# Patient Record
Sex: FEMALE | Race: ASIAN | NOT HISPANIC OR LATINO | Employment: FULL TIME | URBAN - METROPOLITAN AREA
[De-identification: names, ages, dates, MRNs, and addresses within clinical notes are randomized per-mention and may not be internally consistent; named-entity substitution may affect disease eponyms.]

---

## 2020-04-21 ENCOUNTER — HOSPITAL ENCOUNTER (OUTPATIENT)
Dept: LAB | Facility: MEDICAL CENTER | Age: 45
End: 2020-04-21
Attending: PHYSICIAN ASSISTANT
Payer: COMMERCIAL

## 2020-04-21 ENCOUNTER — HOSPITAL ENCOUNTER (OUTPATIENT)
Dept: RADIOLOGY | Facility: MEDICAL CENTER | Age: 45
End: 2020-04-21
Attending: PHYSICIAN ASSISTANT
Payer: COMMERCIAL

## 2020-04-21 ENCOUNTER — OFFICE VISIT (OUTPATIENT)
Dept: URGENT CARE | Facility: PHYSICIAN GROUP | Age: 45
End: 2020-04-21
Payer: COMMERCIAL

## 2020-04-21 VITALS
DIASTOLIC BLOOD PRESSURE: 76 MMHG | SYSTOLIC BLOOD PRESSURE: 110 MMHG | HEIGHT: 60 IN | HEART RATE: 84 BPM | OXYGEN SATURATION: 97 % | TEMPERATURE: 97.7 F | WEIGHT: 170 LBS | BODY MASS INDEX: 33.38 KG/M2

## 2020-04-21 DIAGNOSIS — K80.20 CALCULUS OF GALLBLADDER WITHOUT CHOLECYSTITIS WITHOUT OBSTRUCTION: ICD-10-CM

## 2020-04-21 DIAGNOSIS — R10.11 RIGHT UPPER QUADRANT PAIN: ICD-10-CM

## 2020-04-21 DIAGNOSIS — R10.13 EPIGASTRIC PAIN: ICD-10-CM

## 2020-04-21 DIAGNOSIS — R10.10 PAIN OF UPPER ABDOMEN: ICD-10-CM

## 2020-04-21 LAB
ALBUMIN SERPL BCP-MCNC: 4.4 G/DL (ref 3.2–4.9)
ALBUMIN/GLOB SERPL: 1.3 G/DL
ALP SERPL-CCNC: 92 U/L (ref 30–99)
ALT SERPL-CCNC: 664 U/L (ref 2–50)
ANION GAP SERPL CALC-SCNC: 16 MMOL/L (ref 7–16)
APPEARANCE UR: CLEAR
AST SERPL-CCNC: 758 U/L (ref 12–45)
BASOPHILS # BLD AUTO: 0.6 % (ref 0–1.8)
BASOPHILS # BLD: 0.07 K/UL (ref 0–0.12)
BILIRUB SERPL-MCNC: 1.9 MG/DL (ref 0.1–1.5)
BILIRUB UR STRIP-MCNC: ABNORMAL MG/DL
BUN SERPL-MCNC: 15 MG/DL (ref 8–22)
CALCIUM SERPL-MCNC: 10.5 MG/DL (ref 8.5–10.5)
CHLORIDE SERPL-SCNC: 94 MMOL/L (ref 96–112)
CO2 SERPL-SCNC: 28 MMOL/L (ref 20–33)
COLOR UR AUTO: ABNORMAL
CREAT SERPL-MCNC: 0.96 MG/DL (ref 0.5–1.4)
EOSINOPHIL # BLD AUTO: 0.03 K/UL (ref 0–0.51)
EOSINOPHIL NFR BLD: 0.2 % (ref 0–6.9)
ERYTHROCYTE [DISTWIDTH] IN BLOOD BY AUTOMATED COUNT: 38.8 FL (ref 35.9–50)
FASTING STATUS PATIENT QL REPORTED: NORMAL
GLOBULIN SER CALC-MCNC: 3.3 G/DL (ref 1.9–3.5)
GLUCOSE SERPL-MCNC: 178 MG/DL (ref 65–99)
GLUCOSE UR STRIP.AUTO-MCNC: NEGATIVE MG/DL
HCT VFR BLD AUTO: 44.2 % (ref 37–47)
HGB BLD-MCNC: 15.1 G/DL (ref 12–16)
IMM GRANULOCYTES # BLD AUTO: 0.04 K/UL (ref 0–0.11)
IMM GRANULOCYTES NFR BLD AUTO: 0.3 % (ref 0–0.9)
INT CON NEG: NORMAL
INT CON POS: NORMAL
KETONES UR STRIP.AUTO-MCNC: NEGATIVE MG/DL
LEUKOCYTE ESTERASE UR QL STRIP.AUTO: NEGATIVE
LIPASE SERPL-CCNC: 74 U/L (ref 11–82)
LYMPHOCYTES # BLD AUTO: 0.85 K/UL (ref 1–4.8)
LYMPHOCYTES NFR BLD: 6.8 % (ref 22–41)
MCH RBC QN AUTO: 30.4 PG (ref 27–33)
MCHC RBC AUTO-ENTMCNC: 34.2 G/DL (ref 33.6–35)
MCV RBC AUTO: 89.1 FL (ref 81.4–97.8)
MONOCYTES # BLD AUTO: 0.46 K/UL (ref 0–0.85)
MONOCYTES NFR BLD AUTO: 3.7 % (ref 0–13.4)
NEUTROPHILS # BLD AUTO: 11 K/UL (ref 2–7.15)
NEUTROPHILS NFR BLD: 88.4 % (ref 44–72)
NITRITE UR QL STRIP.AUTO: NEGATIVE
NRBC # BLD AUTO: 0 K/UL
NRBC BLD-RTO: 0 /100 WBC
PH UR STRIP.AUTO: 8.5 [PH] (ref 5–8)
PLATELET # BLD AUTO: 365 K/UL (ref 164–446)
PMV BLD AUTO: 10.1 FL (ref 9–12.9)
POC URINE PREGNANCY TEST: NEGATIVE
POTASSIUM SERPL-SCNC: 3.9 MMOL/L (ref 3.6–5.5)
PROT SERPL-MCNC: 7.7 G/DL (ref 6–8.2)
PROT UR QL STRIP: ABNORMAL MG/DL
RBC # BLD AUTO: 4.96 M/UL (ref 4.2–5.4)
RBC UR QL AUTO: NEGATIVE
SODIUM SERPL-SCNC: 138 MMOL/L (ref 135–145)
SP GR UR STRIP.AUTO: 1.02
UROBILINOGEN UR STRIP-MCNC: 0.2 MG/DL
WBC # BLD AUTO: 12.5 K/UL (ref 4.8–10.8)

## 2020-04-21 PROCEDURE — 80053 COMPREHEN METABOLIC PANEL: CPT

## 2020-04-21 PROCEDURE — 85025 COMPLETE CBC W/AUTO DIFF WBC: CPT

## 2020-04-21 PROCEDURE — 99204 OFFICE O/P NEW MOD 45 MIN: CPT | Performed by: PHYSICIAN ASSISTANT

## 2020-04-21 PROCEDURE — 36415 COLL VENOUS BLD VENIPUNCTURE: CPT

## 2020-04-21 PROCEDURE — 81002 URINALYSIS NONAUTO W/O SCOPE: CPT | Performed by: PHYSICIAN ASSISTANT

## 2020-04-21 PROCEDURE — 81025 URINE PREGNANCY TEST: CPT | Performed by: PHYSICIAN ASSISTANT

## 2020-04-21 PROCEDURE — 83690 ASSAY OF LIPASE: CPT

## 2020-04-21 PROCEDURE — 76705 ECHO EXAM OF ABDOMEN: CPT

## 2020-04-21 RX ORDER — ATORVASTATIN CALCIUM 10 MG/1
10 TABLET, FILM COATED ORAL DAILY
COMMUNITY
End: 2021-09-11

## 2020-04-21 RX ORDER — LEVOTHYROXINE SODIUM 88 UG/1
88 TABLET ORAL SEE ADMIN INSTRUCTIONS
COMMUNITY
Start: 2020-04-04 | End: 2021-09-11

## 2020-04-21 RX ORDER — NEBIVOLOL 10 MG/1
10 TABLET ORAL DAILY
COMMUNITY
Start: 2020-04-04

## 2020-04-21 RX ORDER — TRIAMTERENE AND HYDROCHLOROTHIAZIDE 37.5; 25 MG/1; MG/1
TABLET ORAL
COMMUNITY
Start: 2020-04-07 | End: 2021-06-06

## 2020-04-21 RX ORDER — OMEPRAZOLE 40 MG/1
40 CAPSULE, DELAYED RELEASE ORAL DAILY
Qty: 30 CAP | Refills: 0 | Status: SHIPPED | OUTPATIENT
Start: 2020-04-21 | End: 2021-06-06

## 2020-04-21 SDOH — HEALTH STABILITY: MENTAL HEALTH: HOW OFTEN DO YOU HAVE A DRINK CONTAINING ALCOHOL?: NEVER

## 2020-04-21 ASSESSMENT — ENCOUNTER SYMPTOMS
BACK PAIN: 0
CONSTIPATION: 0
FEVER: 0
BLOOD IN STOOL: 0
NAUSEA: 0
CHILLS: 0
DIZZINESS: 0
HEADACHES: 0
DIARRHEA: 0
HEARTBURN: 0
PALPITATIONS: 0
VOMITING: 0
ABDOMINAL PAIN: 1

## 2020-04-21 ASSESSMENT — PAIN SCALES - GENERAL: PAINLEVEL: 8=MODERATE-SEVERE PAIN

## 2020-04-21 NOTE — PATIENT INSTRUCTIONS
Cholelithiasis  Cholelithiasis is a form of gallbladder disease in which gallstones form in the gallbladder. The gallbladder is an organ that stores bile. Bile is made in the liver, and it helps to digest fats. Gallstones begin as small crystals and slowly grow into stones. They may cause no symptoms until the gallbladder tightens (contracts) and a gallstone is blocking the duct (gallbladder attack), which can cause pain. Cholelithiasis is also referred to as gallstones.  There are two main types of gallstones:  · Cholesterol stones. These are made of hardened cholesterol and are usually yellow-green in color. They are the most common type of gallstone. Cholesterol is a white, waxy, fat-like substance that is made in the liver.  · Pigment stones. These are dark in color and are made of a red-yellow substance that forms when hemoglobin from red blood cells breaks down (bilirubin).  What are the causes?  This condition may be caused by an imbalance in the substances that bile is made of. This can happen if the bile:  · Has too much bilirubin.  · Has too much cholesterol.  · Does not have enough bile salts. These salts help the body absorb and digest fats.  In some cases, this condition can also be caused by the gallbladder not emptying completely or often enough.  What increases the risk?  The following factors may make you more likely to develop this condition:  · Being female.  · Having multiple pregnancies. Health care providers sometimes advise removing diseased gallbladders before future pregnancies.  · Eating a diet that is heavy in fried foods, fat, and refined carbohydrates, like white bread and white rice.  · Being obese.  · Being older than age 40.  · Prolonged use of medicines that contain female hormones (estrogen).  · Having diabetes mellitus.  · Rapidly losing weight.  · Having a family history of gallstones.  · Being of  or [place] descent.  · Having an intestinal disease such as Crohn  disease.  · Having metabolic syndrome.  · Having cirrhosis.  · Having severe types of anemia such as sickle cell anemia.  What are the signs or symptoms?  In most cases, there are no symptoms. These are known as silent gallstones. If a gallstone blocks the bile ducts, it can cause a gallbladder attack. The main symptom of a gallbladder attack is sudden pain in the upper right abdomen. The pain usually comes at night or after eating a large meal. The pain can last for one or several hours and can spread to the right shoulder or chest.  If the bile duct is blocked for more than a few hours, it can cause infection or inflammation of the gallbladder, liver, or pancreas, which may cause:  · Nausea.  · Vomiting.  · Abdominal pain that lasts for 5 hours or more.  · Fever or chills.  · Yellowing of the skin or the whites of the eyes (jaundice).  · Dark urine.  · Light-colored stools.  How is this diagnosed?  This condition may be diagnosed based on:  · A physical exam.  · Your medical history.  · An ultrasound of your gallbladder.  · CT scan.  · MRI.  · Blood tests to check for signs of infection or inflammation.  · A scan of your gallbladder and bile ducts (biliary system) using nonharmful radioactive material and special cameras that can see the radioactive material (cholescintigram). This test checks to see how your gallbladder contracts and whether bile ducts are blocked.  · Inserting a small tube with a camera on the end (endoscope) through your mouth to inspect bile ducts and check for blockages (endoscopic retrograde cholangiopancreatogram).  How is this treated?  Treatment for gallstones depends on the severity of the condition. Silent gallstones do not need treatment. If the gallstones cause a gallbladder attack or other symptoms, treatment may be required. Options for treatment include:  · Surgery to remove the gallbladder (cholecystectomy). This is the most common treatment.  · Medicines to dissolve gallstones.  These are most effective at treating small gallstones. You may need to take medicines for up to 6-12 months.  · Shock wave treatment (extracorporeal biliary lithotripsy). In this treatment, an ultrasound machine sends shock waves to the gallbladder to break gallstones into smaller pieces. These pieces can then be passed into the intestines or be dissolved by medicine. This is rarely used.  · Removing gallstones through endoscopic retrograde cholangiopancreatogram. A small basket can be attached to the endoscope and used to capture and remove gallstones.  Follow these instructions at home:  · Take over-the-counter and prescription medicines only as told by your health care provider.  · Maintain a healthy weight and follow a healthy diet. This includes:  ¨ Reducing fatty foods, such as fried food.  ¨ Reducing refined carbohydrates, like white bread and white rice.  ¨ Increasing fiber. Aim for foods like almonds, fruit, and beans.  · Keep all follow-up visits as told by your health care provider. This is important.  Contact a health care provider if:  · You think you have had a gallbladder attack.  · You have been diagnosed with silent gallstones and you develop abdominal pain or indigestion.  Get help right away if:  · You have pain from a gallbladder attack that lasts for more than 2 hours.  · You have abdominal pain that lasts for more than 5 hours.  · You have a fever or chills.  · You have persistent nausea and vomiting.  · You develop jaundice.  · You have dark urine or light-colored stools.  Summary  · Cholelithiasis (also called gallstones) is a form of gallbladder disease in which gallstones form in the gallbladder.  · This condition is caused by an imbalance in the substances that make up bile. This can happen if the bile has too much cholesterol, too much bilirubin, or not enough bile salts.  · You are more likely to develop this condition if you are female, pregnant, using medicines with estrogen, obese,  older than age 40, or have a family history of gallstones. You may also develop gallstones if you have diabetes, an intestinal disease, cirrhosis, or metabolic syndrome.  · Treatment for gallstones depends on the severity of the condition. Silent gallstones do not need treatment.  · If gallstones cause a gallbladder attack or other symptoms, treatment may be needed. The most common treatment is surgery to remove the gallbladder.  This information is not intended to replace advice given to you by your health care provider. Make sure you discuss any questions you have with your health care provider.  Document Released: 12/14/2006 Document Revised: 09/03/2017 Document Reviewed: 09/03/2017  [employer] Patient Education © 2017 [employer]

## 2020-04-21 NOTE — PROGRESS NOTES
Subjective:   Bekah Ahumada is a 44 y.o. female who presents for Abdominal Pain (cramping and persistant pain in upper abdomenal pain, after dinner, bloating x1day)        This is a new problem.  Patient complains of epigastric and right upper quadrant postprandial pain x 2 days.  Symptoms began last night after dinner.  Pain was fairly severe at 8 out of 10.  Did not radiate.  Pain was relieved by placing her arms overhead.  Additionally she induced vomiting and this also improved symptoms.  She took Tums and Gas-X-these did not help symptoms.  She denies nausea, fevers, chills, diarrhea.  At 4 AM symptoms completely resolved and she was asymptomatic until she ate brunch at 11 AM today.  Patient developed identical symptoms that have not improved since initial onset.  Patient denies history of similar symptoms.  Her mother did have her gallbladder removed.  Patient has history of laparoscopic hysterectomy, but otherwise no abdominal surgery.  No other aggravating or alleviating factors.    Review of Systems   Constitutional: Negative for chills, fever and malaise/fatigue.   Cardiovascular: Negative for chest pain and palpitations.   Gastrointestinal: Positive for abdominal pain. Negative for blood in stool, constipation, diarrhea, heartburn, melena, nausea and vomiting.   Musculoskeletal: Negative for back pain.   Neurological: Negative for dizziness and headaches.       PMH:  has a past medical history of Diabetes ([facility]).  MEDS:   Current Outpatient Medications:   •  atorvastatin (LIPITOR) 10 MG Tab, , Disp: , Rfl:   •  levothyroxine (SYNTHROID) 88 MCG Tab, Take 88 mcg by mouth every day., Disp: , Rfl:   •  nebivolol (BYSTOLIC) 10 MG Tab, Take 10 mg by mouth every day., Disp: , Rfl:   •  triamterene-hctz (MAXZIDE-25/DYAZIDE) 37.5-25 MG Tab, , Disp: , Rfl:   •  SITagliptin (JANUVIA) 50 MG Tab, Take 50 mg by mouth every day., Disp: , Rfl:   •  omeprazole (PRILOSEC) 40 MG delayed-release capsule, Take 1  Cap by mouth every day., Disp: 30 Cap, Rfl: 0  ALLERGIES:   Allergies   Allergen Reactions   • Acetazolamide      Tachycardia, dyspnea  Tachycardia, dyspnea  (taking meds for high altitude)       SURGHX: History reviewed. No pertinent surgical history.  SOCHX:  reports that she has never smoked. She has never used smokeless tobacco. She reports that she does not drink alcohol or use drugs.  FH: Family history was reviewed, no pertinent findings to report   Objective:   /76   Pulse 84   Temp 36.5 °C (97.7 °F)   Ht 1.524 m (5')   Wt 77.1 kg (170 lb)   SpO2 97%   BMI 33.20 kg/m²   Physical Exam  Vitals signs reviewed.   Constitutional:       General: She is not in acute distress.     Appearance: Normal appearance. She is well-developed. She is not ill-appearing or toxic-appearing.   HENT:      Head: Normocephalic and atraumatic.      Right Ear: External ear normal.      Left Ear: External ear normal.      Nose: Nose normal.   Eyes:      General: Lids are normal.      Conjunctiva/sclera: Conjunctivae normal.   Neck:      Musculoskeletal: Neck supple.   Cardiovascular:      Rate and Rhythm: Normal rate and regular rhythm.   Pulmonary:      Effort: Pulmonary effort is normal. No respiratory distress.      Breath sounds: Normal breath sounds.   Abdominal:      General: Abdomen is flat. Bowel sounds are normal.      Palpations: Abdomen is soft.      Tenderness: There is abdominal tenderness in the right upper quadrant and epigastric area. There is no right CVA tenderness, left CVA tenderness, guarding or rebound. Positive signs include Titus's sign. Negative signs include McBurney's sign.   Skin:     General: Skin is warm and dry.      Capillary Refill: Capillary refill takes less than 2 seconds.   Neurological:      Mental Status: She is alert and oriented to person, place, and time.      Cranial Nerves: No cranial nerve deficit.      Sensory: No sensory deficit.   Psychiatric:         Speech: Speech normal.          Behavior: Behavior normal.         Thought Content: Thought content normal.         Judgment: Judgment normal.           Assessment/Plan:   1. Calculus of gallbladder without cholecystitis without obstruction  - REFERRAL TO GENERAL SURGERY    2. Pain of upper abdomen  - POCT Urinalysis  - POCT Pregnancy  - omeprazole (PRILOSEC) 40 MG delayed-release capsule; Take 1 Cap by mouth every day.  Dispense: 30 Cap; Refill: 0    3. Epigastric pain  - US-RUQ; Future  - CBC WITH DIFFERENTIAL; Future  - LIPASE; Future  - Comp Metabolic Panel; Future  - omeprazole (PRILOSEC) 40 MG delayed-release capsule; Take 1 Cap by mouth every day.  Dispense: 30 Cap; Refill: 0    4. Right upper quadrant pain  - US-RUQ; Future  - CBC WITH DIFFERENTIAL; Future  - LIPASE; Future  - Comp Metabolic Panel; Future    Other orders  - atorvastatin (LIPITOR) 10 MG Tab  - levothyroxine (SYNTHROID) 88 MCG Tab; Take 88 mcg by mouth every day.  - nebivolol (BYSTOLIC) 10 MG Tab; Take 10 mg by mouth every day.  - triamterene-hctz (MAXZIDE-25/DYAZIDE) 37.5-25 MG Tab  - SITagliptin (JANUVIA) 50 MG Tab; Take 50 mg by mouth every day.    History and physical exam suggestive of cholelithiasis.  Other considerations include but not limited to cholecystitis, choledocholithiasis, hepatitis, pancreatitis, gastritis, GERD.  Ultrasound and labs to further evaluate:    US:  FINDINGS:  The liver is normal in contour. The liver is homogeneous and has increased echogenicity consistent with fatty infiltration. There is no evidence of solid mass lesion. The liver measures 18.61 cm.     The gallbladder contains multiple moderate-sized gallstones.  The gallbladder wall thickness measures 1.30 mm. There is no pericholecystic fluid.  The common duct measures 7.10 mm. No choledocholithiasis is identified.     The visualized pancreas is unremarkable.  The visualized aorta is normal in caliber.     Intrahepatic IVC is patent.     The portal vein is patent with hepatopetal  flow. The MPV measures 1.02 cm.     The right kidney measures 11.16 cm.     There is no ascites.     IMPRESSION:     1.  Cholelithiasis. No intrahepatic biliary dilatation. Mild extrahepatic biliary dilatation. No choledocholithiasis identified.     2.  Fatty infiltration appearance of the liver.     3.  Otherwise negative right upper quadrant ultrasound.    Ultrasound reviewed with patient.  Labs are pending at this time.  Patient's pain spontaneously resolved while she was in clinic and reports that pain level currently 0 out of 10. Patient well-appearing, and vital signs stable.  No nausea, vomiting, fevers, chills diarrhea.  Urgent referral placed to follow-up with general surgery.  Patient is from out of the area and does not have PCP to follow-up with in [place].  Patient given very strict ED precautions.  Both patient and  verbalized good understanding of these.    ----    Patient labs resulted and reviewed at 1830.  Patient's bili slightly elevated at 1.9 and liver enzymes significantly elevated at  and .  Alk phos fate upper end of normal at 92. Mild leukocytosis at 12.5 with left shift.  Lipase within normal limits at 74.    Lab results concerning for possible biliary obstruction.  Case discussed with Urgent Care chief,  Dr. Calderón.  Dr. Calderón recommends stat GEN surge referral with strict ED precautions, as long as patient remains completely asymptomatic.  If patient has ANY recurrence of pain, development of nausea, vomiting, fevers, chills or any other symptoms she was instructed to go to the ED for immediate medical reevaluation.  All lab abnormalities and concerns about possible biliary obstruction discussed with patient over the phone in 1900.  Patient verbalized good understanding of my concerns and that she needs to go to the emergency room with any recurrence of symptoms/pain. All questions answered.     Differential diagnosis, natural history, supportive care, and indications for  immediate follow-up discussed.

## 2021-06-06 ENCOUNTER — HOSPITAL ENCOUNTER (EMERGENCY)
Facility: MEDICAL CENTER | Age: 46
End: 2021-06-06
Attending: EMERGENCY MEDICINE
Payer: COMMERCIAL

## 2021-06-06 ENCOUNTER — APPOINTMENT (OUTPATIENT)
Dept: RADIOLOGY | Facility: MEDICAL CENTER | Age: 46
End: 2021-06-06
Attending: EMERGENCY MEDICINE
Payer: COMMERCIAL

## 2021-06-06 VITALS
OXYGEN SATURATION: 98 % | DIASTOLIC BLOOD PRESSURE: 59 MMHG | TEMPERATURE: 101.6 F | SYSTOLIC BLOOD PRESSURE: 116 MMHG | WEIGHT: 172.84 LBS | BODY MASS INDEX: 33.93 KG/M2 | RESPIRATION RATE: 20 BRPM | HEART RATE: 86 BPM | HEIGHT: 60 IN

## 2021-06-06 DIAGNOSIS — R50.9 FEVER, UNSPECIFIED FEVER CAUSE: ICD-10-CM

## 2021-06-06 DIAGNOSIS — R21 RASH: ICD-10-CM

## 2021-06-06 LAB
ALBUMIN SERPL BCP-MCNC: 3.4 G/DL (ref 3.2–4.9)
ALBUMIN SERPL BCP-MCNC: 3.6 G/DL (ref 3.2–4.9)
ALBUMIN/GLOB SERPL: 1.1 G/DL
ALBUMIN/GLOB SERPL: 1.3 G/DL
ALP SERPL-CCNC: 60 U/L (ref 30–99)
ALP SERPL-CCNC: 63 U/L (ref 30–99)
ALT SERPL-CCNC: 120 U/L (ref 2–50)
ALT SERPL-CCNC: 144 U/L (ref 2–50)
ANION GAP SERPL CALC-SCNC: 11 MMOL/L (ref 7–16)
ANION GAP SERPL CALC-SCNC: 9 MMOL/L (ref 7–16)
ANISOCYTOSIS BLD QL SMEAR: ABNORMAL
APAP SERPL-MCNC: <5 UG/ML (ref 10–30)
APPEARANCE UR: CLEAR
AST SERPL-CCNC: 114 U/L (ref 12–45)
AST SERPL-CCNC: 162 U/L (ref 12–45)
BASOPHILS # BLD AUTO: 0 % (ref 0–1.8)
BASOPHILS # BLD: 0 K/UL (ref 0–0.12)
BILIRUB SERPL-MCNC: 0.5 MG/DL (ref 0.1–1.5)
BILIRUB SERPL-MCNC: 0.6 MG/DL (ref 0.1–1.5)
BILIRUB UR QL STRIP.AUTO: NEGATIVE
BUN SERPL-MCNC: 8 MG/DL (ref 8–22)
BUN SERPL-MCNC: 8 MG/DL (ref 8–22)
CALCIUM SERPL-MCNC: 8.3 MG/DL (ref 8.5–10.5)
CALCIUM SERPL-MCNC: 8.8 MG/DL (ref 8.5–10.5)
CHLORIDE SERPL-SCNC: 102 MMOL/L (ref 96–112)
CHLORIDE SERPL-SCNC: 99 MMOL/L (ref 96–112)
CO2 SERPL-SCNC: 21 MMOL/L (ref 20–33)
CO2 SERPL-SCNC: 23 MMOL/L (ref 20–33)
COLOR UR: YELLOW
CREAT SERPL-MCNC: 1.11 MG/DL (ref 0.5–1.4)
CREAT SERPL-MCNC: 1.21 MG/DL (ref 0.5–1.4)
EOSINOPHIL # BLD AUTO: 0 K/UL (ref 0–0.51)
EOSINOPHIL NFR BLD: 0 % (ref 0–6.9)
ERYTHROCYTE [DISTWIDTH] IN BLOOD BY AUTOMATED COUNT: 43.9 FL (ref 35.9–50)
FLUAV RNA SPEC QL NAA+PROBE: NEGATIVE
FLUBV RNA SPEC QL NAA+PROBE: NEGATIVE
GLOBULIN SER CALC-MCNC: 2.7 G/DL (ref 1.9–3.5)
GLOBULIN SER CALC-MCNC: 3.3 G/DL (ref 1.9–3.5)
GLUCOSE SERPL-MCNC: 108 MG/DL (ref 65–99)
GLUCOSE SERPL-MCNC: 130 MG/DL (ref 65–99)
GLUCOSE UR STRIP.AUTO-MCNC: NEGATIVE MG/DL
HAV IGM SERPL QL IA: NORMAL
HBV CORE IGM SER QL: NORMAL
HBV SURFACE AG SER QL: NORMAL
HCT VFR BLD AUTO: 34.3 % (ref 37–47)
HCV AB SER QL: NORMAL
HETEROPH AB SER QL: NEGATIVE
HGB BLD-MCNC: 11.7 G/DL (ref 12–16)
INR PPP: 1.05 (ref 0.87–1.13)
KETONES UR STRIP.AUTO-MCNC: NEGATIVE MG/DL
LACTATE BLD-SCNC: 2 MMOL/L (ref 0.5–2)
LACTATE BLD-SCNC: 2.5 MMOL/L (ref 0.5–2)
LEUKOCYTE ESTERASE UR QL STRIP.AUTO: NEGATIVE
LYMPHOCYTES # BLD AUTO: 1.36 K/UL (ref 1–4.8)
LYMPHOCYTES NFR BLD: 14.2 % (ref 22–41)
MANUAL DIFF BLD: NORMAL
MCH RBC QN AUTO: 31.8 PG (ref 27–33)
MCHC RBC AUTO-ENTMCNC: 34.1 G/DL (ref 33.6–35)
MCV RBC AUTO: 93.2 FL (ref 81.4–97.8)
MICRO URNS: NORMAL
MICROCYTES BLD QL SMEAR: ABNORMAL
MONOCYTES # BLD AUTO: 0 K/UL (ref 0–0.85)
MONOCYTES NFR BLD AUTO: 0 % (ref 0–13.4)
MORPHOLOGY BLD-IMP: NORMAL
NEUTROPHILS # BLD AUTO: 8.24 K/UL (ref 2–7.15)
NEUTROPHILS NFR BLD: 84.9 % (ref 44–72)
NEUTS BAND NFR BLD MANUAL: 0.9 % (ref 0–10)
NITRITE UR QL STRIP.AUTO: NEGATIVE
NRBC # BLD AUTO: 0.04 K/UL
NRBC BLD-RTO: 0.4 /100 WBC
PH UR STRIP.AUTO: 6 [PH] (ref 5–8)
PLATELET # BLD AUTO: 323 K/UL (ref 164–446)
PLATELET BLD QL SMEAR: NORMAL
PMV BLD AUTO: 8.8 FL (ref 9–12.9)
POLYCHROMASIA BLD QL SMEAR: NORMAL
POTASSIUM SERPL-SCNC: 3.4 MMOL/L (ref 3.6–5.5)
POTASSIUM SERPL-SCNC: 3.5 MMOL/L (ref 3.6–5.5)
PROT SERPL-MCNC: 6.1 G/DL (ref 6–8.2)
PROT SERPL-MCNC: 6.9 G/DL (ref 6–8.2)
PROT UR QL STRIP: NEGATIVE MG/DL
PROTHROMBIN TIME: 14 SEC (ref 12–14.6)
RBC # BLD AUTO: 3.68 M/UL (ref 4.2–5.4)
RBC BLD AUTO: PRESENT
RBC UR QL AUTO: NEGATIVE
RSV RNA SPEC QL NAA+PROBE: NEGATIVE
SARS-COV-2 RNA RESP QL NAA+PROBE: NOTDETECTED
SODIUM SERPL-SCNC: 131 MMOL/L (ref 135–145)
SODIUM SERPL-SCNC: 134 MMOL/L (ref 135–145)
SP GR UR STRIP.AUTO: 1
SPECIMEN SOURCE: NORMAL
T4 FREE SERPL-MCNC: 1.08 NG/DL (ref 0.93–1.7)
TREPONEMA PALLIDUM IGG+IGM AB [PRESENCE] IN SERUM OR PLASMA BY IMMUNOASSAY: NORMAL
TSH SERPL DL<=0.005 MIU/L-ACNC: 2.53 UIU/ML (ref 0.38–5.33)
UROBILINOGEN UR STRIP.AUTO-MCNC: 0.2 MG/DL
WBC # BLD AUTO: 9.6 K/UL (ref 4.8–10.8)

## 2021-06-06 PROCEDURE — C9803 HOPD COVID-19 SPEC COLLECT: HCPCS | Performed by: EMERGENCY MEDICINE

## 2021-06-06 PROCEDURE — 84439 ASSAY OF FREE THYROXINE: CPT

## 2021-06-06 PROCEDURE — 86618 LYME DISEASE ANTIBODY: CPT

## 2021-06-06 PROCEDURE — 85610 PROTHROMBIN TIME: CPT

## 2021-06-06 PROCEDURE — 84443 ASSAY THYROID STIM HORMONE: CPT

## 2021-06-06 PROCEDURE — 700102 HCHG RX REV CODE 250 W/ 637 OVERRIDE(OP): Performed by: STUDENT IN AN ORGANIZED HEALTH CARE EDUCATION/TRAINING PROGRAM

## 2021-06-06 PROCEDURE — 80053 COMPREHEN METABOLIC PANEL: CPT

## 2021-06-06 PROCEDURE — 80143 DRUG ASSAY ACETAMINOPHEN: CPT

## 2021-06-06 PROCEDURE — 86308 HETEROPHILE ANTIBODY SCREEN: CPT

## 2021-06-06 PROCEDURE — A9270 NON-COVERED ITEM OR SERVICE: HCPCS | Performed by: EMERGENCY MEDICINE

## 2021-06-06 PROCEDURE — 71045 X-RAY EXAM CHEST 1 VIEW: CPT

## 2021-06-06 PROCEDURE — 99284 EMERGENCY DEPT VISIT MOD MDM: CPT

## 2021-06-06 PROCEDURE — 87040 BLOOD CULTURE FOR BACTERIA: CPT

## 2021-06-06 PROCEDURE — 86757 RICKETTSIA ANTIBODY: CPT

## 2021-06-06 PROCEDURE — 700105 HCHG RX REV CODE 258: Performed by: EMERGENCY MEDICINE

## 2021-06-06 PROCEDURE — 83605 ASSAY OF LACTIC ACID: CPT

## 2021-06-06 PROCEDURE — 87086 URINE CULTURE/COLONY COUNT: CPT

## 2021-06-06 PROCEDURE — 85007 BL SMEAR W/DIFF WBC COUNT: CPT

## 2021-06-06 PROCEDURE — 700102 HCHG RX REV CODE 250 W/ 637 OVERRIDE(OP): Performed by: EMERGENCY MEDICINE

## 2021-06-06 PROCEDURE — 36415 COLL VENOUS BLD VENIPUNCTURE: CPT

## 2021-06-06 PROCEDURE — A9270 NON-COVERED ITEM OR SERVICE: HCPCS | Performed by: STUDENT IN AN ORGANIZED HEALTH CARE EDUCATION/TRAINING PROGRAM

## 2021-06-06 PROCEDURE — 85027 COMPLETE CBC AUTOMATED: CPT

## 2021-06-06 PROCEDURE — 99284 EMERGENCY DEPT VISIT MOD MDM: CPT | Mod: GC | Performed by: HOSPITALIST

## 2021-06-06 PROCEDURE — 0241U HCHG SARS-COV-2 COVID-19 NFCT DS RESP RNA 4 TRGT MIC: CPT

## 2021-06-06 PROCEDURE — 81003 URINALYSIS AUTO W/O SCOPE: CPT

## 2021-06-06 PROCEDURE — 80074 ACUTE HEPATITIS PANEL: CPT

## 2021-06-06 PROCEDURE — 86780 TREPONEMA PALLIDUM: CPT

## 2021-06-06 PROCEDURE — 87476 LYME DIS DNA AMP PROBE: CPT

## 2021-06-06 RX ORDER — POTASSIUM CHLORIDE 20 MEQ/1
40 TABLET, EXTENDED RELEASE ORAL ONCE
Status: COMPLETED | OUTPATIENT
Start: 2021-06-06 | End: 2021-06-06

## 2021-06-06 RX ORDER — DOXYCYCLINE 100 MG/1
100 CAPSULE ORAL 2 TIMES DAILY
Qty: 20 CAPSULE | Refills: 0 | Status: ON HOLD | OUTPATIENT
Start: 2021-06-06 | End: 2021-06-19

## 2021-06-06 RX ORDER — CETIRIZINE HYDROCHLORIDE 10 MG/1
10 CAPSULE, LIQUID FILLED ORAL DAILY
COMMUNITY
End: 2021-06-06

## 2021-06-06 RX ORDER — CETIRIZINE HYDROCHLORIDE 10 MG/1
10 TABLET ORAL DAILY
Status: ON HOLD | COMMUNITY
End: 2021-09-12

## 2021-06-06 RX ORDER — OLMESARTAN MEDOXOMIL 20 MG/1
20 TABLET ORAL DAILY
COMMUNITY
End: 2021-06-06

## 2021-06-06 RX ORDER — IBUPROFEN 600 MG/1
600 TABLET ORAL ONCE
Status: COMPLETED | OUTPATIENT
Start: 2021-06-06 | End: 2021-06-06

## 2021-06-06 RX ORDER — ALLOPURINOL 300 MG/1
300 TABLET ORAL DAILY
COMMUNITY
End: 2021-06-06

## 2021-06-06 RX ORDER — M-VIT,TX,IRON,MINS/CALC/FOLIC 27MG-0.4MG
1 TABLET ORAL DAILY
COMMUNITY
End: 2021-06-06

## 2021-06-06 RX ORDER — SODIUM CHLORIDE, SODIUM LACTATE, POTASSIUM CHLORIDE, AND CALCIUM CHLORIDE .6; .31; .03; .02 G/100ML; G/100ML; G/100ML; G/100ML
30 INJECTION, SOLUTION INTRAVENOUS ONCE
Status: COMPLETED | OUTPATIENT
Start: 2021-06-06 | End: 2021-06-06

## 2021-06-06 RX ORDER — B-COMPLEX WITH VITAMIN C
1 TABLET ORAL DAILY
COMMUNITY

## 2021-06-06 RX ADMIN — IBUPROFEN 600 MG: 600 TABLET, FILM COATED ORAL at 13:10

## 2021-06-06 RX ADMIN — SODIUM CHLORIDE, POTASSIUM CHLORIDE, SODIUM LACTATE AND CALCIUM CHLORIDE 2000 ML: 600; 310; 30; 20 INJECTION, SOLUTION INTRAVENOUS at 14:16

## 2021-06-06 RX ADMIN — POTASSIUM CHLORIDE 40 MEQ: 1500 TABLET, EXTENDED RELEASE ORAL at 18:12

## 2021-06-06 ASSESSMENT — ENCOUNTER SYMPTOMS
MYALGIAS: 0
ABDOMINAL PAIN: 1
FEVER: 1
COUGH: 0
BLOOD IN STOOL: 0
PHOTOPHOBIA: 0
SHORTNESS OF BREATH: 0
NAUSEA: 1
DIZZINESS: 0
DIARRHEA: 1
CHILLS: 1
BLURRED VISION: 0
VOMITING: 0
CONSTIPATION: 0
WEIGHT LOSS: 0
TINGLING: 0

## 2021-06-06 ASSESSMENT — LIFESTYLE VARIABLES: DO YOU DRINK ALCOHOL: NO

## 2021-06-06 ASSESSMENT — FIBROSIS 4 INDEX: FIB4 SCORE: 3.79

## 2021-06-06 NOTE — ED TRIAGE NOTES
Ambulates to triage  Chief Complaint   Patient presents with   • Fever     x3 days 102 this morning before taking dayquil   • Rash     to arms and torso, noticed this morning   • Ear Pain     pain behild both ears x3 days     Current temp is 98.9, pt noticed a rash this morning, does not itch, denies any cough, started to get a headache today, denies body aches.  [facility]

## 2021-06-06 NOTE — ED NOTES
Med rec updated and complete. Allergies reviewed.  Met with pt at bedside. Pt denies antibiotic use in last 14 days. Pt only takes levothyroxine Monday -Friday         [phone]

## 2021-06-06 NOTE — ED PROVIDER NOTES
ED Provider Note    CHIEF COMPLAINT  Chief Complaint   Patient presents with   • Fever     x3 days 102 this morning before taking dayquil   • Rash     to arms and torso, noticed this morning   • Ear Pain     pain behild both ears x3 days       HPI  Bekah Morrell is a 45 y.o. diabetic female with history of hypertension and dyslipidemia as well as thyroid disorder who presents complaining of fever and rash.    Patient states she has had a fever for 3 days.  T-max of 103.  Today, she noticed a generalized rash that is nonpruritic.  She also reports nausea and generalized, crampy abdominal pain with loose stool x4 today.  She states the area behind her ears is sensitive to touch.    Patient denies recent travel outside the country, recent antibiotics, urinary symptoms, vomiting, sick contacts, tick bites.      ALLERGIES  Allergies   Allergen Reactions   • Acetazolamide      Tachycardia, dyspnea  Tachycardia, dyspnea  (taking meds for high altitude)         CURRENT MEDICATIONS  Home Medications     Reviewed by Krissy Mason R.N. (Registered Nurse) on 06/06/21 at 1028  Med List Status: Complete   Medication Last Dose Status   allopurinol (ZYLOPRIM) 300 MG Tab 6/6/2021 Active   atorvastatin (LIPITOR) 10 MG Tab 6/5/2021 Active   Cetirizine HCl (ZYRTEC ALLERGY) 10 MG Cap 6/6/2021 Active   levothyroxine (SYNTHROID) 88 MCG Tab 6/6/2021 Active   nebivolol (BYSTOLIC) 10 MG Tab 6/6/2021 Active   olmesartan (BENICAR) 20 MG Tab 6/6/2021 Active   SITagliptin (JANUVIA) 50 MG Tab 6/6/2021 Active                PAST MEDICAL HISTORY   has a past medical history of Diabetes (HCC), High cholesterol, Hypertension, and Hypothyroid.    SURGICAL HISTORY   has a past surgical history that includes cholecystectomy (07/2020); hysterectomy radical (2018); and primary c section (1991).    SOCIAL HISTORY  Social History     Tobacco Use   • Smoking status: Never Smoker   • Smokeless tobacco: Never Used   Substance and Sexual  Activity   • Alcohol use: Never   • Drug use: Never   • Sexual activity: Not on file       Family Hx:  Denies      REVIEW OF SYSTEMS  See HPI for further details.  All other systems are negative except as above in HPI.    PHYSICAL EXAM  VITAL SIGNS: /64   Pulse 99   Temp (!) 39.7 °C (103.5 °F) (Oral)   Resp 20   Ht 1.524 m (5')   Wt 78.4 kg (172 lb 13.5 oz)   SpO2 96%   BMI 33.76 kg/m²    General:  WDWN female, nontoxic appearing in NAD; A+Ox3; V/S as above; afebrile  Skin: warm and dry; good color; diffuse morbilliform rash over the trunk and extremities; no petechiae or purpura  HEENT: NCAT; EOMs intact; PERRL; no scleral icterus; no mastoid tenderness, oropharynx clear  Neck: FROM; no meningismus, no LAD; no occipital nodes  Cardiovascular: Regular heart rate and rhythm.  No murmurs, rubs, or gallops; pulses 2+ bilaterally radially and DP areas  Lungs: Clear to auscultation with good air movement bilaterally.  No wheezes, rhonchi, or rales.   Abdomen: BS present; soft; NTND; no rebound, guarding, or rigidity.  No organomegaly or pulsatile mass  Extremities: GUERRERO x 4; no e/o trauma; no pedal edema; neg Zoe's  Neurologic: CNs III-XII grossly intact; speech clear; distal sensation intact; strength 5/5 UE/LEs  Psychiatric: Appropriate affect, normal mood    LABS  Results for orders placed or performed during the hospital encounter of 06/06/21   CBC WITH DIFFERENTIAL   Result Value Ref Range    WBC 9.6 4.8 - 10.8 K/uL    RBC 3.68 (L) 4.20 - 5.40 M/uL    Hemoglobin 11.7 (L) 12.0 - 16.0 g/dL    Hematocrit 34.3 (L) 37.0 - 47.0 %    MCV 93.2 81.4 - 97.8 fL    MCH 31.8 27.0 - 33.0 pg    MCHC 34.1 33.6 - 35.0 g/dL    RDW 43.9 35.9 - 50.0 fL    Platelet Count 323 164 - 446 K/uL    MPV 8.8 (L) 9.0 - 12.9 fL    Neutrophils-Polys 84.90 (H) 44.00 - 72.00 %    Lymphocytes 14.20 (L) 22.00 - 41.00 %    Monocytes 0.00 0.00 - 13.40 %    Eosinophils 0.00 0.00 - 6.90 %    Basophils 0.00 0.00 - 1.80 %    Nucleated RBC 0.40  /100 WBC    Neutrophils (Absolute) 8.24 (H) 2.00 - 7.15 K/uL    Lymphs (Absolute) 1.36 1.00 - 4.80 K/uL    Monos (Absolute) 0.00 0.00 - 0.85 K/uL    Eos (Absolute) 0.00 0.00 - 0.51 K/uL    Baso (Absolute) 0.00 0.00 - 0.12 K/uL    NRBC (Absolute) 0.04 K/uL    Anisocytosis 1+     Microcytosis 1+    CMP   Result Value Ref Range    Sodium 134 (L) 135 - 145 mmol/L    Potassium 3.5 (L) 3.6 - 5.5 mmol/L    Chloride 102 96 - 112 mmol/L    Co2 21 20 - 33 mmol/L    Anion Gap 11.0 7.0 - 16.0    Glucose 130 (H) 65 - 99 mg/dL    Bun 8 8 - 22 mg/dL    Creatinine 1.21 0.50 - 1.40 mg/dL    Calcium 8.8 8.5 - 10.5 mg/dL    AST(SGOT) 114 (H) 12 - 45 U/L    ALT(SGPT) 120 (H) 2 - 50 U/L    Alkaline Phosphatase 63 30 - 99 U/L    Total Bilirubin 0.5 0.1 - 1.5 mg/dL    Albumin 3.6 3.2 - 4.9 g/dL    Total Protein 6.9 6.0 - 8.2 g/dL    Globulin 3.3 1.9 - 3.5 g/dL    A-G Ratio 1.1 g/dL   T.PALLIDUM AB EIA   Result Value Ref Range    Syphilis, Treponemal Qual Non-Reactive Non-Reactive   ESTIMATED GFR   Result Value Ref Range    GFR If  57 (A) >60 mL/min/1.73 m 2    GFR If Non  48 (A) >60 mL/min/1.73 m 2   DIFFERENTIAL MANUAL   Result Value Ref Range    Bands-Stabs 0.90 0.00 - 10.00 %    Manual Diff Status PERFORMED    PERIPHERAL SMEAR REVIEW   Result Value Ref Range    Peripheral Smear Review see below    PLATELET ESTIMATE   Result Value Ref Range    Plt Estimation Normal    MORPHOLOGY   Result Value Ref Range    RBC Morphology Present     Polychromia 1+    URINALYSIS    Specimen: Urine   Result Value Ref Range    Color Yellow     Character Clear     Specific Gravity 1.004 <1.035    Ph 6.0 5.0 - 8.0    Glucose Negative Negative mg/dL    Ketones Negative Negative mg/dL    Protein Negative Negative mg/dL    Bilirubin Negative Negative    Urobilinogen, Urine 0.2 Negative    Nitrite Negative Negative    Leukocyte Esterase Negative Negative    Occult Blood Negative Negative    Micro Urine Req see below    LACTIC ACID    Result Value Ref Range    Lactic Acid 2.5 (H) 0.5 - 2.0 mmol/L         MEDICAL RECORD  I have reviewed patient's medical record and pertinent results are listed below.      COURSE & MEDICAL DECISION MAKING  I have reviewed any medical record information, laboratory studies and radiographic results as noted.    Bekah Morrell is a 45 y.o. female who presents complaining of fever, rash, abdominal pain, diarrhea.    Patient is afebrile here and nontoxic-appearing.  The rash is nonpruritic and appears viral.  No petechiae.    Appropriate PPE was worn at all times while interacting with the patient, including goggles, N95 mask, and surgical mask.    12:44 PM  Pt noted to have a fever now.  We will avoid Tylenol given the bump in transaminases.  I have ordered ibuprofen along with blood culture, urine culture, urinalysis, chest x-ray, Lyme and Rickettsial testing.      Patient's fever continues to rise.  Her lactic acid is 2.5.  IV fluids per sepsis protocol were ordered.  This is likely viral in nature.  We will admit for culture results and further work-up.  I have added a TSH and free thyroxine to evaluate for thyroid storm.    2:01 PM  I discussed the case with Dr. Hill from the hospitalist service who agrees to admit the patient.    FINAL IMPRESSION  1. Rash     2. Fever, unspecified fever cause              Electronically signed by: Ronda Kirk M.D., 6/6/2021 10:51 AM
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
## 2021-06-07 NOTE — ED NOTES
Pt discharge home, will f/u pcp for cmp recheck. Pt given discharge instructions and prescription sent to her pharmacy. Pt verbalized understanding, all questions answered ,pt's temp 101.6, offered medication. Pt decided to just take it at home. Informed Dr.Jensen sousa to d/c. Pt steady on feet upon discharge

## 2021-06-07 NOTE — DISCHARGE INSTRUCTIONS
Follow up with your primary care doctor with labs (complete metabolic panel) in 3-4 days  Return to the hospital immediately if your symptoms get worse

## 2021-06-08 LAB
BACTERIA UR CULT: NORMAL
SIGNIFICANT IND 70042: NORMAL
SITE SITE: NORMAL
SOURCE SOURCE: NORMAL

## 2021-06-09 LAB
B BURGDOR AB SER IA-ACNC: 0.68 LIV (ref 0–1.2)
R RICKETTSI IGG TITR SER IF: NORMAL {TITER}
R RICKETTSI IGM TITR SER IF: NORMAL {TITER}

## 2021-06-10 LAB
B BURGDOR DNA SPEC QL NAA+PROBE: NOT DETECTED
LYME SOURCE Q4169: NORMAL

## 2021-06-11 LAB
BACTERIA BLD CULT: NORMAL
BACTERIA BLD CULT: NORMAL
SIGNIFICANT IND 70042: NORMAL
SIGNIFICANT IND 70042: NORMAL
SITE SITE: NORMAL
SITE SITE: NORMAL
SOURCE SOURCE: NORMAL
SOURCE SOURCE: NORMAL

## 2021-06-15 ENCOUNTER — APPOINTMENT (OUTPATIENT)
Dept: RADIOLOGY | Facility: MEDICAL CENTER | Age: 46
DRG: 814 | End: 2021-06-15
Attending: EMERGENCY MEDICINE
Payer: COMMERCIAL

## 2021-06-15 ENCOUNTER — HOSPITAL ENCOUNTER (INPATIENT)
Facility: MEDICAL CENTER | Age: 46
LOS: 4 days | DRG: 814 | End: 2021-06-19
Attending: EMERGENCY MEDICINE | Admitting: STUDENT IN AN ORGANIZED HEALTH CARE EDUCATION/TRAINING PROGRAM
Payer: COMMERCIAL

## 2021-06-15 DIAGNOSIS — A41.89 SEPSIS DUE TO OTHER ETIOLOGY (HCC): ICD-10-CM

## 2021-06-15 PROBLEM — R00.0 TACHYCARDIA: Status: ACTIVE | Noted: 2021-06-15

## 2021-06-15 PROBLEM — F03.90 DEMENTIA (HCC): Status: ACTIVE | Noted: 2021-06-15

## 2021-06-15 PROBLEM — R00.0 TACHYCARDIA: Status: RESOLVED | Noted: 2021-06-15 | Resolved: 2021-06-15

## 2021-06-15 LAB
ALBUMIN SERPL BCP-MCNC: 2.5 G/DL (ref 3.2–4.9)
ALBUMIN/GLOB SERPL: 0.5 G/DL
ALP SERPL-CCNC: 761 U/L (ref 30–99)
ALT SERPL-CCNC: 481 U/L (ref 2–50)
ANION GAP SERPL CALC-SCNC: 13 MMOL/L (ref 7–16)
APPEARANCE UR: CLEAR
AST SERPL-CCNC: 320 U/L (ref 12–45)
BACTERIA #/AREA URNS HPF: ABNORMAL /HPF
BILIRUB SERPL-MCNC: 6.4 MG/DL (ref 0.1–1.5)
BILIRUB UR QL STRIP.AUTO: ABNORMAL
BUN SERPL-MCNC: 13 MG/DL (ref 8–22)
CALCIUM SERPL-MCNC: 8.4 MG/DL (ref 8.5–10.5)
CHLORIDE SERPL-SCNC: 93 MMOL/L (ref 96–112)
CO2 SERPL-SCNC: 22 MMOL/L (ref 20–33)
COLOR UR: ABNORMAL
CREAT SERPL-MCNC: 0.94 MG/DL (ref 0.5–1.4)
EPI CELLS #/AREA URNS HPF: NEGATIVE /HPF
ERYTHROCYTE [DISTWIDTH] IN BLOOD BY AUTOMATED COUNT: 47.8 FL (ref 35.9–50)
FLUAV RNA SPEC QL NAA+PROBE: NEGATIVE
FLUBV RNA SPEC QL NAA+PROBE: NEGATIVE
GLOBULIN SER CALC-MCNC: 4.8 G/DL (ref 1.9–3.5)
GLUCOSE BLD-MCNC: 78 MG/DL (ref 65–99)
GLUCOSE SERPL-MCNC: 83 MG/DL (ref 65–99)
GLUCOSE UR STRIP.AUTO-MCNC: NEGATIVE MG/DL
HCT VFR BLD AUTO: 32.7 % (ref 37–47)
HGB BLD-MCNC: 11.2 G/DL (ref 12–16)
HYALINE CASTS #/AREA URNS LPF: ABNORMAL /LPF
KETONES UR STRIP.AUTO-MCNC: ABNORMAL MG/DL
LACTATE BLD-SCNC: 4.2 MMOL/L (ref 0.5–2)
LACTATE BLD-SCNC: 5.6 MMOL/L (ref 0.5–2)
LEUKOCYTE ESTERASE UR QL STRIP.AUTO: ABNORMAL
MCH RBC QN AUTO: 30.7 PG (ref 27–33)
MCHC RBC AUTO-ENTMCNC: 34.3 G/DL (ref 33.6–35)
MCV RBC AUTO: 89.6 FL (ref 81.4–97.8)
MICRO URNS: ABNORMAL
MORPHOLOGY BLD-IMP: NORMAL
MUCOUS THREADS #/AREA URNS HPF: ABNORMAL /HPF
NITRITE UR QL STRIP.AUTO: POSITIVE
PH UR STRIP.AUTO: 6 [PH] (ref 5–8)
PLATELET # BLD AUTO: 334 K/UL (ref 164–446)
PMV BLD AUTO: 10.5 FL (ref 9–12.9)
POTASSIUM SERPL-SCNC: 4 MMOL/L (ref 3.6–5.5)
PROCALCITONIN SERPL-MCNC: 2.95 NG/ML
PROT SERPL-MCNC: 7.3 G/DL (ref 6–8.2)
PROT UR QL STRIP: 100 MG/DL
RBC # BLD AUTO: 3.65 M/UL (ref 4.2–5.4)
RBC # URNS HPF: ABNORMAL /HPF
RBC UR QL AUTO: ABNORMAL
RENAL EPI CELLS #/AREA URNS HPF: ABNORMAL /HPF
RSV RNA SPEC QL NAA+PROBE: NEGATIVE
SARS-COV-2 RNA RESP QL NAA+PROBE: NOTDETECTED
SODIUM SERPL-SCNC: 128 MMOL/L (ref 135–145)
SP GR UR STRIP.AUTO: 1.02
SPECIMEN SOURCE: NORMAL
UROBILINOGEN UR STRIP.AUTO-MCNC: 1 MG/DL
WBC # BLD AUTO: 27.4 K/UL (ref 4.8–10.8)
WBC #/AREA URNS HPF: ABNORMAL /HPF

## 2021-06-15 PROCEDURE — 87040 BLOOD CULTURE FOR BACTERIA: CPT | Mod: 91

## 2021-06-15 PROCEDURE — 87449 NOS EACH ORGANISM AG IA: CPT

## 2021-06-15 PROCEDURE — 0241U HCHG SARS-COV-2 COVID-19 NFCT DS RESP RNA 4 TRGT MIC: CPT

## 2021-06-15 PROCEDURE — 96375 TX/PRO/DX INJ NEW DRUG ADDON: CPT

## 2021-06-15 PROCEDURE — C9803 HOPD COVID-19 SPEC COLLECT: HCPCS | Performed by: EMERGENCY MEDICINE

## 2021-06-15 PROCEDURE — 700111 HCHG RX REV CODE 636 W/ 250 OVERRIDE (IP): Performed by: EMERGENCY MEDICINE

## 2021-06-15 PROCEDURE — 82962 GLUCOSE BLOOD TEST: CPT

## 2021-06-15 PROCEDURE — 99223 1ST HOSP IP/OBS HIGH 75: CPT | Performed by: STUDENT IN AN ORGANIZED HEALTH CARE EDUCATION/TRAINING PROGRAM

## 2021-06-15 PROCEDURE — 87899 AGENT NOS ASSAY W/OPTIC: CPT

## 2021-06-15 PROCEDURE — 700105 HCHG RX REV CODE 258: Performed by: EMERGENCY MEDICINE

## 2021-06-15 PROCEDURE — 99285 EMERGENCY DEPT VISIT HI MDM: CPT

## 2021-06-15 PROCEDURE — 85007 BL SMEAR W/DIFF WBC COUNT: CPT

## 2021-06-15 PROCEDURE — 80053 COMPREHEN METABOLIC PANEL: CPT

## 2021-06-15 PROCEDURE — 81001 URINALYSIS AUTO W/SCOPE: CPT

## 2021-06-15 PROCEDURE — 84145 PROCALCITONIN (PCT): CPT

## 2021-06-15 PROCEDURE — 85027 COMPLETE CBC AUTOMATED: CPT

## 2021-06-15 PROCEDURE — 83605 ASSAY OF LACTIC ACID: CPT

## 2021-06-15 PROCEDURE — 87086 URINE CULTURE/COLONY COUNT: CPT

## 2021-06-15 PROCEDURE — 770020 HCHG ROOM/CARE - TELE (206)

## 2021-06-15 PROCEDURE — 71045 X-RAY EXAM CHEST 1 VIEW: CPT

## 2021-06-15 PROCEDURE — 96365 THER/PROPH/DIAG IV INF INIT: CPT

## 2021-06-15 RX ORDER — BISACODYL 10 MG
10 SUPPOSITORY, RECTAL RECTAL
Status: DISCONTINUED | OUTPATIENT
Start: 2021-06-15 | End: 2021-06-19 | Stop reason: HOSPADM

## 2021-06-15 RX ORDER — POLYETHYLENE GLYCOL 3350 17 G/17G
1 POWDER, FOR SOLUTION ORAL
Status: DISCONTINUED | OUTPATIENT
Start: 2021-06-15 | End: 2021-06-19 | Stop reason: HOSPADM

## 2021-06-15 RX ORDER — VITAMIN C
1 TAB ORAL DAILY
Status: DISCONTINUED | OUTPATIENT
Start: 2021-06-16 | End: 2021-06-19 | Stop reason: HOSPADM

## 2021-06-15 RX ORDER — SODIUM CHLORIDE, SODIUM LACTATE, POTASSIUM CHLORIDE, AND CALCIUM CHLORIDE .6; .31; .03; .02 G/100ML; G/100ML; G/100ML; G/100ML
500 INJECTION, SOLUTION INTRAVENOUS
Status: DISCONTINUED | OUTPATIENT
Start: 2021-06-15 | End: 2021-06-19 | Stop reason: HOSPADM

## 2021-06-15 RX ORDER — AZITHROMYCIN 500 MG/5ML
500 INJECTION, POWDER, LYOPHILIZED, FOR SOLUTION INTRAVENOUS EVERY 24 HOURS
Status: DISCONTINUED | OUTPATIENT
Start: 2021-06-16 | End: 2021-06-16

## 2021-06-15 RX ORDER — PROMETHAZINE HYDROCHLORIDE 25 MG/1
12.5-25 TABLET ORAL EVERY 4 HOURS PRN
Status: DISCONTINUED | OUTPATIENT
Start: 2021-06-15 | End: 2021-06-19 | Stop reason: HOSPADM

## 2021-06-15 RX ORDER — SODIUM CHLORIDE, SODIUM LACTATE, POTASSIUM CHLORIDE, CALCIUM CHLORIDE 600; 310; 30; 20 MG/100ML; MG/100ML; MG/100ML; MG/100ML
1000 INJECTION, SOLUTION INTRAVENOUS ONCE
Status: COMPLETED | OUTPATIENT
Start: 2021-06-16 | End: 2021-06-16

## 2021-06-15 RX ORDER — LEVOTHYROXINE SODIUM 88 UG/1
88 TABLET ORAL
Status: DISCONTINUED | OUTPATIENT
Start: 2021-06-16 | End: 2021-06-19 | Stop reason: HOSPADM

## 2021-06-15 RX ORDER — METOPROLOL TARTRATE 50 MG/1
50 TABLET, FILM COATED ORAL TWICE DAILY
Status: DISCONTINUED | OUTPATIENT
Start: 2021-06-16 | End: 2021-06-19 | Stop reason: HOSPADM

## 2021-06-15 RX ORDER — ONDANSETRON 2 MG/ML
4 INJECTION INTRAMUSCULAR; INTRAVENOUS ONCE
Status: COMPLETED | OUTPATIENT
Start: 2021-06-15 | End: 2021-06-15

## 2021-06-15 RX ORDER — CETIRIZINE HYDROCHLORIDE 10 MG/1
10 TABLET ORAL DAILY
Status: DISCONTINUED | OUTPATIENT
Start: 2021-06-16 | End: 2021-06-19 | Stop reason: HOSPADM

## 2021-06-15 RX ORDER — AMOXICILLIN 250 MG
2 CAPSULE ORAL 2 TIMES DAILY
Status: DISCONTINUED | OUTPATIENT
Start: 2021-06-15 | End: 2021-06-19 | Stop reason: HOSPADM

## 2021-06-15 RX ORDER — ACETAMINOPHEN 325 MG/1
650 TABLET ORAL EVERY 6 HOURS PRN
Status: DISCONTINUED | OUTPATIENT
Start: 2021-06-15 | End: 2021-06-19 | Stop reason: HOSPADM

## 2021-06-15 RX ORDER — ONDANSETRON 2 MG/ML
4 INJECTION INTRAMUSCULAR; INTRAVENOUS EVERY 4 HOURS PRN
Status: DISCONTINUED | OUTPATIENT
Start: 2021-06-15 | End: 2021-06-19 | Stop reason: HOSPADM

## 2021-06-15 RX ORDER — MAGNESIUM SULFATE HEPTAHYDRATE 40 MG/ML
2 INJECTION, SOLUTION INTRAVENOUS ONCE
Status: COMPLETED | OUTPATIENT
Start: 2021-06-15 | End: 2021-06-16

## 2021-06-15 RX ORDER — NEBIVOLOL 10 MG/1
10 TABLET ORAL DAILY
Status: DISCONTINUED | OUTPATIENT
Start: 2021-06-16 | End: 2021-06-15

## 2021-06-15 RX ORDER — ATORVASTATIN CALCIUM 10 MG/1
10 TABLET, FILM COATED ORAL DAILY
Status: DISCONTINUED | OUTPATIENT
Start: 2021-06-16 | End: 2021-06-15

## 2021-06-15 RX ORDER — PROCHLORPERAZINE EDISYLATE 5 MG/ML
5-10 INJECTION INTRAMUSCULAR; INTRAVENOUS EVERY 4 HOURS PRN
Status: DISCONTINUED | OUTPATIENT
Start: 2021-06-15 | End: 2021-06-19 | Stop reason: HOSPADM

## 2021-06-15 RX ORDER — SODIUM CHLORIDE, SODIUM LACTATE, POTASSIUM CHLORIDE, AND CALCIUM CHLORIDE .6; .31; .03; .02 G/100ML; G/100ML; G/100ML; G/100ML
30 INJECTION, SOLUTION INTRAVENOUS ONCE
Status: COMPLETED | OUTPATIENT
Start: 2021-06-15 | End: 2021-06-15

## 2021-06-15 RX ORDER — ONDANSETRON 4 MG/1
4 TABLET, ORALLY DISINTEGRATING ORAL EVERY 4 HOURS PRN
Status: DISCONTINUED | OUTPATIENT
Start: 2021-06-15 | End: 2021-06-19 | Stop reason: HOSPADM

## 2021-06-15 RX ORDER — DEXTROSE MONOHYDRATE 25 G/50ML
50 INJECTION, SOLUTION INTRAVENOUS
Status: DISCONTINUED | OUTPATIENT
Start: 2021-06-15 | End: 2021-06-19 | Stop reason: HOSPADM

## 2021-06-15 RX ORDER — LORAZEPAM 2 MG/ML
0.5 INJECTION INTRAMUSCULAR EVERY 12 HOURS PRN
Status: DISCONTINUED | OUTPATIENT
Start: 2021-06-15 | End: 2021-06-19 | Stop reason: HOSPADM

## 2021-06-15 RX ORDER — MORPHINE SULFATE 4 MG/ML
4 INJECTION, SOLUTION INTRAMUSCULAR; INTRAVENOUS ONCE
Status: COMPLETED | OUTPATIENT
Start: 2021-06-15 | End: 2021-06-15

## 2021-06-15 RX ORDER — PROMETHAZINE HYDROCHLORIDE 12.5 MG/1
12.5-25 SUPPOSITORY RECTAL EVERY 4 HOURS PRN
Status: DISCONTINUED | OUTPATIENT
Start: 2021-06-15 | End: 2021-06-19 | Stop reason: HOSPADM

## 2021-06-15 RX ORDER — GUAIFENESIN/DEXTROMETHORPHAN 100-10MG/5
10 SYRUP ORAL EVERY 6 HOURS PRN
Status: DISCONTINUED | OUTPATIENT
Start: 2021-06-15 | End: 2021-06-19 | Stop reason: HOSPADM

## 2021-06-15 RX ORDER — SODIUM CHLORIDE, SODIUM LACTATE, POTASSIUM CHLORIDE, CALCIUM CHLORIDE 600; 310; 30; 20 MG/100ML; MG/100ML; MG/100ML; MG/100ML
2000 INJECTION, SOLUTION INTRAVENOUS CONTINUOUS
Status: DISCONTINUED | OUTPATIENT
Start: 2021-06-15 | End: 2021-06-16

## 2021-06-15 RX ADMIN — SODIUM CHLORIDE, POTASSIUM CHLORIDE, SODIUM LACTATE AND CALCIUM CHLORIDE 2367 ML: 600; 310; 30; 20 INJECTION, SOLUTION INTRAVENOUS at 21:22

## 2021-06-15 RX ADMIN — ONDANSETRON 4 MG: 2 INJECTION INTRAMUSCULAR; INTRAVENOUS at 21:20

## 2021-06-15 RX ADMIN — VANCOMYCIN HYDROCHLORIDE 2000 MG: 500 INJECTION, POWDER, LYOPHILIZED, FOR SOLUTION INTRAVENOUS at 23:25

## 2021-06-15 RX ADMIN — MORPHINE SULFATE 4 MG: 4 INJECTION INTRAVENOUS at 21:20

## 2021-06-15 ASSESSMENT — FIBROSIS 4 INDEX: FIB4 SCORE: 1.96

## 2021-06-15 NOTE — ED TRIAGE NOTES
"Chief Complaint   Patient presents with   • Rash     Pt has had a rash since 6/6 and was seen and told to come back if sxs worsened. Pt was sent home with abx and has been taking them since. Last dose will be tomorrow. Rash has now spread \"to her whole body\".    • Fever     Pt states she has had fevers on and of since. Pt states she had a fever of 100/7 this AM and took tylenol for that.    • Cough     Pt started having a cough last Friday. Pt states the cough is mostly dry.    • Nausea     Pt has had nausea since sxs started.      /80   Pulse (!) 112   Temp 35.8 °C (96.5 °F) (Temporal)   Resp 16   Ht 1.524 m (5')   Wt 78.9 kg (173 lb 15.1 oz)   SpO2 98%   BMI 33.97 kg/m²     Patient arrived to ED for the above complaint. Triage process explained to patient. Patient placed in lobby and told to notify staff of any changes.   "

## 2021-06-16 ENCOUNTER — APPOINTMENT (OUTPATIENT)
Dept: RADIOLOGY | Facility: MEDICAL CENTER | Age: 46
DRG: 814 | End: 2021-06-16
Attending: STUDENT IN AN ORGANIZED HEALTH CARE EDUCATION/TRAINING PROGRAM
Payer: COMMERCIAL

## 2021-06-16 PROBLEM — E78.5 HLD (HYPERLIPIDEMIA): Status: ACTIVE | Noted: 2021-06-16

## 2021-06-16 PROBLEM — N39.0 UTI (URINARY TRACT INFECTION): Status: ACTIVE | Noted: 2021-06-16

## 2021-06-16 PROBLEM — A41.9 SEPSIS (HCC): Status: ACTIVE | Noted: 2021-06-16

## 2021-06-16 PROBLEM — E11.9 DIABETES (HCC): Status: ACTIVE | Noted: 2021-06-16

## 2021-06-16 PROBLEM — J18.9 PNEUMONIA: Status: RESOLVED | Noted: 2021-06-16 | Resolved: 2021-06-16

## 2021-06-16 PROBLEM — E87.20 LACTIC ACIDOSIS: Status: ACTIVE | Noted: 2021-06-16

## 2021-06-16 PROBLEM — E80.6 HYPERBILIRUBINEMIA: Status: ACTIVE | Noted: 2021-06-16

## 2021-06-16 PROBLEM — I10 HTN (HYPERTENSION): Status: ACTIVE | Noted: 2021-06-16

## 2021-06-16 PROBLEM — E87.1 HYPONATREMIA: Status: ACTIVE | Noted: 2021-06-16

## 2021-06-16 PROBLEM — R22.1 SWOLLEN NECK: Status: ACTIVE | Noted: 2021-06-16

## 2021-06-16 PROBLEM — R79.89 LFT ELEVATION: Status: ACTIVE | Noted: 2021-06-16

## 2021-06-16 PROBLEM — J18.9 PNEUMONIA: Status: ACTIVE | Noted: 2021-06-16

## 2021-06-16 PROBLEM — L03.90 CELLULITIS: Status: ACTIVE | Noted: 2021-06-16

## 2021-06-16 PROBLEM — E03.9 HYPOTHYROIDISM: Status: ACTIVE | Noted: 2021-06-16

## 2021-06-16 PROBLEM — R65.20 SEVERE SEPSIS (HCC): Status: ACTIVE | Noted: 2021-06-16

## 2021-06-16 PROBLEM — E80.6 HYPERBILIRUBINEMIA: Status: RESOLVED | Noted: 2021-06-16 | Resolved: 2021-06-16

## 2021-06-16 PROBLEM — K72.00 ACUTE LIVER FAILURE WITHOUT HEPATIC COMA: Status: ACTIVE | Noted: 2021-06-16

## 2021-06-16 PROBLEM — D64.9 ANEMIA: Status: ACTIVE | Noted: 2021-06-16

## 2021-06-16 PROBLEM — R21 RASH: Status: ACTIVE | Noted: 2021-06-16

## 2021-06-16 PROBLEM — E86.0 DEHYDRATION: Status: ACTIVE | Noted: 2021-06-16

## 2021-06-16 LAB
ALBUMIN SERPL BCP-MCNC: 2.3 G/DL (ref 3.2–4.9)
ALBUMIN/GLOB SERPL: 0.5 G/DL
ALP SERPL-CCNC: 656 U/L (ref 30–99)
ALT SERPL-CCNC: 404 U/L (ref 2–50)
ANION GAP SERPL CALC-SCNC: 14 MMOL/L (ref 7–16)
ANISOCYTOSIS BLD QL SMEAR: ABNORMAL
ANISOCYTOSIS BLD QL SMEAR: ABNORMAL
AST SERPL-CCNC: 330 U/L (ref 12–45)
BASOPHILS # BLD AUTO: 0 % (ref 0–1.8)
BASOPHILS # BLD AUTO: 0 % (ref 0–1.8)
BASOPHILS # BLD: 0 K/UL (ref 0–0.12)
BILIRUB SERPL-MCNC: 5.6 MG/DL (ref 0.1–1.5)
BUN SERPL-MCNC: 11 MG/DL (ref 8–22)
CALCIUM SERPL-MCNC: 7.6 MG/DL (ref 8.5–10.5)
CHLORIDE SERPL-SCNC: 95 MMOL/L (ref 96–112)
CO2 SERPL-SCNC: 20 MMOL/L (ref 20–33)
CORTIS SERPL-MCNC: 26.1 UG/DL (ref 0–23)
CREAT SERPL-MCNC: 0.92 MG/DL (ref 0.5–1.4)
EOSINOPHIL # BLD AUTO: 4.19 K/UL (ref 0–0.51)
EOSINOPHIL # BLD AUTO: 5.1 K/UL (ref 0–0.51)
EOSINOPHIL NFR BLD: 14.5 % (ref 0–6.9)
EOSINOPHIL NFR BLD: 18.6 % (ref 0–6.9)
ERYTHROCYTE [DISTWIDTH] IN BLOOD BY AUTOMATED COUNT: 49.8 FL (ref 35.9–50)
ERYTHROCYTE [SEDIMENTATION RATE] IN BLOOD BY WESTERGREN METHOD: 77 MM/HOUR (ref 0–25)
FIBRINOGEN PPP-MCNC: 405 MG/DL (ref 215–460)
GLOBULIN SER CALC-MCNC: 4.4 G/DL (ref 1.9–3.5)
GLUCOSE BLD-MCNC: 65 MG/DL (ref 65–99)
GLUCOSE BLD-MCNC: 87 MG/DL (ref 65–99)
GLUCOSE SERPL-MCNC: 82 MG/DL (ref 65–99)
HAV IGM SERPL QL IA: NORMAL
HBV CORE IGM SER QL: NORMAL
HBV SURFACE AG SER QL: NORMAL
HCT VFR BLD AUTO: 30 % (ref 37–47)
HCV AB SER QL: NORMAL
HGB BLD-MCNC: 10 G/DL (ref 12–16)
HIV 1+2 AB+HIV1 P24 AG SERPL QL IA: NORMAL
INR PPP: 1.46 (ref 0.87–1.13)
LACTATE BLD-SCNC: 3.3 MMOL/L (ref 0.5–2)
LACTATE BLD-SCNC: 3.3 MMOL/L (ref 0.5–2)
LACTATE BLD-SCNC: 4.7 MMOL/L (ref 0.5–2)
LYMPHOCYTES # BLD AUTO: 4.88 K/UL (ref 1–4.8)
LYMPHOCYTES # BLD AUTO: 5.17 K/UL (ref 1–4.8)
LYMPHOCYTES NFR BLD: 17.8 % (ref 22–41)
LYMPHOCYTES NFR BLD: 17.9 % (ref 22–41)
MACROCYTES BLD QL SMEAR: ABNORMAL
MACROCYTES BLD QL SMEAR: ABNORMAL
MAGNESIUM SERPL-MCNC: 2.4 MG/DL (ref 1.5–2.5)
MANUAL DIFF BLD: ABNORMAL
MANUAL DIFF BLD: NORMAL
MCH RBC QN AUTO: 30.8 PG (ref 27–33)
MCHC RBC AUTO-ENTMCNC: 33.3 G/DL (ref 33.6–35)
MCV RBC AUTO: 92.3 FL (ref 81.4–97.8)
MONOCYTES # BLD AUTO: 0.49 K/UL (ref 0–0.85)
MONOCYTES NFR BLD AUTO: 1.7 % (ref 0–13.4)
MONOCYTES NFR BLD AUTO: 5.1 % (ref 0–13.4)
MORPHOLOGY BLD-IMP: NORMAL
MYELOCYTES NFR BLD MANUAL: 1.7 %
NEUTROPHILS # BLD AUTO: 13.95 K/UL (ref 2–7.15)
NEUTROPHILS # BLD AUTO: 18.29 K/UL (ref 2–7.15)
NEUTROPHILS NFR BLD: 50.9 % (ref 44–72)
NEUTROPHILS NFR BLD: 62.4 % (ref 44–72)
NEUTS BAND NFR BLD MANUAL: 0.9 % (ref 0–10)
NRBC # BLD AUTO: 0.83 K/UL
NRBC BLD-RTO: 2.8 /100 WBC
NRBC BLD-RTO: 2.9 /100 WBC
OSMOLALITY SERPL: 276 MOSM/KG H2O (ref 278–298)
PATHOLOGY CONSULT NOTE: NORMAL
PHOSPHATE SERPL-MCNC: 2.3 MG/DL (ref 2.5–4.5)
PLATELET # BLD AUTO: 301 K/UL (ref 164–446)
PLATELET BLD QL SMEAR: ADEQUATE
PLATELET BLD QL SMEAR: NORMAL
PMV BLD AUTO: 10.6 FL (ref 9–12.9)
POIKILOCYTOSIS BLD QL SMEAR: NORMAL
POLYCHROMASIA BLD QL SMEAR: ABNORMAL
POLYCHROMASIA BLD QL SMEAR: NORMAL
POTASSIUM SERPL-SCNC: 4.3 MMOL/L (ref 3.6–5.5)
PROCALCITONIN SERPL-MCNC: 2.87 NG/ML
PROMYELOCYTES NFR BLD MANUAL: 0.9 %
PROT SERPL-MCNC: 6.7 G/DL (ref 6–8.2)
PROTHROMBIN TIME: 17.3 SEC (ref 12–14.6)
RBC # BLD AUTO: 3.25 M/UL (ref 4.2–5.4)
RBC BLD AUTO: PRESENT
RBC BLD AUTO: PRESENT
SODIUM SERPL-SCNC: 129 MMOL/L (ref 135–145)
STOMATOCYTES BLD QL SMEAR: NORMAL
TSH SERPL DL<=0.005 MIU/L-ACNC: 2.08 UIU/ML (ref 0.38–5.33)
WBC # BLD AUTO: 28.9 K/UL (ref 4.8–10.8)

## 2021-06-16 PROCEDURE — 99233 SBSQ HOSP IP/OBS HIGH 50: CPT | Mod: 25 | Performed by: HOSPITALIST

## 2021-06-16 PROCEDURE — 86720 LEPTOSPIRA ANTIBODY: CPT

## 2021-06-16 PROCEDURE — 700117 HCHG RX CONTRAST REV CODE 255: Performed by: STUDENT IN AN ORGANIZED HEALTH CARE EDUCATION/TRAINING PROGRAM

## 2021-06-16 PROCEDURE — 700105 HCHG RX REV CODE 258: Performed by: HOSPITALIST

## 2021-06-16 PROCEDURE — 86780 TREPONEMA PALLIDUM: CPT

## 2021-06-16 PROCEDURE — 87389 HIV-1 AG W/HIV-1&-2 AB AG IA: CPT

## 2021-06-16 PROCEDURE — 86255 FLUORESCENT ANTIBODY SCREEN: CPT | Mod: 91

## 2021-06-16 PROCEDURE — 84443 ASSAY THYROID STIM HORMONE: CPT

## 2021-06-16 PROCEDURE — 700105 HCHG RX REV CODE 258: Performed by: EMERGENCY MEDICINE

## 2021-06-16 PROCEDURE — 86256 FLUORESCENT ANTIBODY TITER: CPT

## 2021-06-16 PROCEDURE — 770020 HCHG ROOM/CARE - TELE (206)

## 2021-06-16 PROCEDURE — A9270 NON-COVERED ITEM OR SERVICE: HCPCS | Performed by: STUDENT IN AN ORGANIZED HEALTH CARE EDUCATION/TRAINING PROGRAM

## 2021-06-16 PROCEDURE — 85610 PROTHROMBIN TIME: CPT

## 2021-06-16 PROCEDURE — 700102 HCHG RX REV CODE 250 W/ 637 OVERRIDE(OP): Performed by: HOSPITALIST

## 2021-06-16 PROCEDURE — 369999 HCHG MISC LAB CHARGE

## 2021-06-16 PROCEDURE — 83930 ASSAY OF BLOOD OSMOLALITY: CPT

## 2021-06-16 PROCEDURE — 700101 HCHG RX REV CODE 250: Performed by: STUDENT IN AN ORGANIZED HEALTH CARE EDUCATION/TRAINING PROGRAM

## 2021-06-16 PROCEDURE — 85027 COMPLETE CBC AUTOMATED: CPT

## 2021-06-16 PROCEDURE — 0HB7XZX EXCISION OF ABDOMEN SKIN, EXTERNAL APPROACH, DIAGNOSTIC: ICD-10-PCS | Performed by: HOSPITALIST

## 2021-06-16 PROCEDURE — 85652 RBC SED RATE AUTOMATED: CPT

## 2021-06-16 PROCEDURE — 82533 TOTAL CORTISOL: CPT

## 2021-06-16 PROCEDURE — 83605 ASSAY OF LACTIC ACID: CPT | Mod: 91

## 2021-06-16 PROCEDURE — 82103 ALPHA-1-ANTITRYPSIN TOTAL: CPT

## 2021-06-16 PROCEDURE — 86038 ANTINUCLEAR ANTIBODIES: CPT

## 2021-06-16 PROCEDURE — 87166 DARK FIELD EXAM W/O COLLJ: CPT

## 2021-06-16 PROCEDURE — 86738 MYCOPLASMA ANTIBODY: CPT | Mod: 91

## 2021-06-16 PROCEDURE — 85007 BL SMEAR W/DIFF WBC COUNT: CPT

## 2021-06-16 PROCEDURE — 99223 1ST HOSP IP/OBS HIGH 75: CPT | Performed by: INTERNAL MEDICINE

## 2021-06-16 PROCEDURE — 86664 EPSTEIN-BARR NUCLEAR ANTIGEN: CPT

## 2021-06-16 PROCEDURE — 700102 HCHG RX REV CODE 250 W/ 637 OVERRIDE(OP): Performed by: STUDENT IN AN ORGANIZED HEALTH CARE EDUCATION/TRAINING PROGRAM

## 2021-06-16 PROCEDURE — 74177 CT ABD & PELVIS W/CONTRAST: CPT

## 2021-06-16 PROCEDURE — 82962 GLUCOSE BLOOD TEST: CPT | Mod: 91

## 2021-06-16 PROCEDURE — 80053 COMPREHEN METABOLIC PANEL: CPT

## 2021-06-16 PROCEDURE — 88305 TISSUE EXAM BY PATHOLOGIST: CPT

## 2021-06-16 PROCEDURE — 700105 HCHG RX REV CODE 258: Performed by: STUDENT IN AN ORGANIZED HEALTH CARE EDUCATION/TRAINING PROGRAM

## 2021-06-16 PROCEDURE — 84145 PROCALCITONIN (PCT): CPT

## 2021-06-16 PROCEDURE — 87081 CULTURE SCREEN ONLY: CPT

## 2021-06-16 PROCEDURE — 87899 AGENT NOS ASSAY W/OPTIC: CPT

## 2021-06-16 PROCEDURE — 80074 ACUTE HEPATITIS PANEL: CPT

## 2021-06-16 PROCEDURE — 700111 HCHG RX REV CODE 636 W/ 250 OVERRIDE (IP): Performed by: EMERGENCY MEDICINE

## 2021-06-16 PROCEDURE — 36415 COLL VENOUS BLD VENIPUNCTURE: CPT

## 2021-06-16 PROCEDURE — A9270 NON-COVERED ITEM OR SERVICE: HCPCS | Performed by: HOSPITALIST

## 2021-06-16 PROCEDURE — 700101 HCHG RX REV CODE 250: Performed by: HOSPITALIST

## 2021-06-16 PROCEDURE — 83516 IMMUNOASSAY NONANTIBODY: CPT

## 2021-06-16 PROCEDURE — 700102 HCHG RX REV CODE 250 W/ 637 OVERRIDE(OP): Performed by: INTERNAL MEDICINE

## 2021-06-16 PROCEDURE — 86663 EPSTEIN-BARR ANTIBODY: CPT

## 2021-06-16 PROCEDURE — 11104 PUNCH BX SKIN SINGLE LESION: CPT | Performed by: HOSPITALIST

## 2021-06-16 PROCEDURE — 700111 HCHG RX REV CODE 636 W/ 250 OVERRIDE (IP): Performed by: STUDENT IN AN ORGANIZED HEALTH CARE EDUCATION/TRAINING PROGRAM

## 2021-06-16 PROCEDURE — 85384 FIBRINOGEN ACTIVITY: CPT

## 2021-06-16 PROCEDURE — 83735 ASSAY OF MAGNESIUM: CPT

## 2021-06-16 PROCEDURE — 70491 CT SOFT TISSUE NECK W/DYE: CPT

## 2021-06-16 PROCEDURE — 86665 EPSTEIN-BARR CAPSID VCA: CPT

## 2021-06-16 PROCEDURE — 87045 FECES CULTURE AEROBIC BACT: CPT

## 2021-06-16 PROCEDURE — A9270 NON-COVERED ITEM OR SERVICE: HCPCS | Performed by: INTERNAL MEDICINE

## 2021-06-16 PROCEDURE — 84100 ASSAY OF PHOSPHORUS: CPT

## 2021-06-16 PROCEDURE — 700111 HCHG RX REV CODE 636 W/ 250 OVERRIDE (IP): Performed by: HOSPITALIST

## 2021-06-16 RX ORDER — AZITHROMYCIN 250 MG/1
500 TABLET, FILM COATED ORAL DAILY
Status: DISCONTINUED | OUTPATIENT
Start: 2021-06-17 | End: 2021-06-16

## 2021-06-16 RX ORDER — SODIUM CHLORIDE, SODIUM LACTATE, POTASSIUM CHLORIDE, CALCIUM CHLORIDE 600; 310; 30; 20 MG/100ML; MG/100ML; MG/100ML; MG/100ML
INJECTION, SOLUTION INTRAVENOUS CONTINUOUS
Status: DISCONTINUED | OUTPATIENT
Start: 2021-06-16 | End: 2021-06-16

## 2021-06-16 RX ORDER — DOXYCYCLINE 100 MG/1
100 TABLET ORAL EVERY 12 HOURS
Status: DISCONTINUED | OUTPATIENT
Start: 2021-06-16 | End: 2021-06-19 | Stop reason: HOSPADM

## 2021-06-16 RX ORDER — SODIUM CHLORIDE, SODIUM LACTATE, POTASSIUM CHLORIDE, CALCIUM CHLORIDE 600; 310; 30; 20 MG/100ML; MG/100ML; MG/100ML; MG/100ML
INJECTION, SOLUTION INTRAVENOUS CONTINUOUS
Status: DISCONTINUED | OUTPATIENT
Start: 2021-06-16 | End: 2021-06-17

## 2021-06-16 RX ORDER — LOPERAMIDE HYDROCHLORIDE 2 MG/1
2 CAPSULE ORAL 4 TIMES DAILY PRN
Status: DISCONTINUED | OUTPATIENT
Start: 2021-06-16 | End: 2021-06-19 | Stop reason: HOSPADM

## 2021-06-16 RX ADMIN — PIPERACILLIN AND TAZOBACTAM 3.38 G: 3; .375 INJECTION, POWDER, LYOPHILIZED, FOR SOLUTION INTRAVENOUS; PARENTERAL at 04:24

## 2021-06-16 RX ADMIN — METOPROLOL TARTRATE 50 MG: 50 TABLET, FILM COATED ORAL at 01:48

## 2021-06-16 RX ADMIN — SODIUM CHLORIDE, POTASSIUM CHLORIDE, SODIUM LACTATE AND CALCIUM CHLORIDE: 600; 310; 30; 20 INJECTION, SOLUTION INTRAVENOUS at 08:02

## 2021-06-16 RX ADMIN — METOPROLOL TARTRATE 50 MG: 50 TABLET, FILM COATED ORAL at 17:55

## 2021-06-16 RX ADMIN — LOPERAMIDE HYDROCHLORIDE 2 MG: 2 CAPSULE ORAL at 17:55

## 2021-06-16 RX ADMIN — SODIUM CHLORIDE 3 G: 900 INJECTION INTRAVENOUS at 01:40

## 2021-06-16 RX ADMIN — ACETAMINOPHEN 650 MG: 325 TABLET, FILM COATED ORAL at 09:09

## 2021-06-16 RX ADMIN — SODIUM CHLORIDE, POTASSIUM CHLORIDE, SODIUM LACTATE AND CALCIUM CHLORIDE 1000 ML: 600; 310; 30; 20 INJECTION, SOLUTION INTRAVENOUS at 01:25

## 2021-06-16 RX ADMIN — IOHEXOL 100 ML: 350 INJECTION, SOLUTION INTRAVENOUS at 12:54

## 2021-06-16 RX ADMIN — AZITHROMYCIN MONOHYDRATE 500 MG: 500 INJECTION, POWDER, LYOPHILIZED, FOR SOLUTION INTRAVENOUS at 01:40

## 2021-06-16 RX ADMIN — SODIUM PHOSPHATE, MONOBASIC, MONOHYDRATE AND SODIUM PHOSPHATE, DIBASIC, ANHYDROUS 30 MMOL: 276; 142 INJECTION, SOLUTION INTRAVENOUS at 10:14

## 2021-06-16 RX ADMIN — MAGNESIUM SULFATE 2 G: 2 INJECTION INTRAVENOUS at 01:47

## 2021-06-16 RX ADMIN — Medication 1 TABLET: at 05:25

## 2021-06-16 RX ADMIN — LEVOTHYROXINE SODIUM 88 MCG: 0.09 TABLET ORAL at 05:28

## 2021-06-16 RX ADMIN — PREDNISONE 80 MG: 20 TABLET ORAL at 15:57

## 2021-06-16 RX ADMIN — LIDOCAINE HYDROCHLORIDE 20 ML: 10 INJECTION, SOLUTION INFILTRATION; PERINEURAL at 13:12

## 2021-06-16 RX ADMIN — SODIUM CHLORIDE, POTASSIUM CHLORIDE, SODIUM LACTATE AND CALCIUM CHLORIDE: 600; 310; 30; 20 INJECTION, SOLUTION INTRAVENOUS at 15:15

## 2021-06-16 RX ADMIN — DOXYCYCLINE 100 MG: 100 TABLET, FILM COATED ORAL at 17:55

## 2021-06-16 RX ADMIN — ENOXAPARIN SODIUM 40 MG: 40 INJECTION SUBCUTANEOUS at 05:25

## 2021-06-16 RX ADMIN — CETIRIZINE HYDROCHLORIDE 10 MG: 10 TABLET, FILM COATED ORAL at 05:26

## 2021-06-16 RX ADMIN — SODIUM CHLORIDE, POTASSIUM CHLORIDE, SODIUM LACTATE AND CALCIUM CHLORIDE: 600; 310; 30; 20 INJECTION, SOLUTION INTRAVENOUS at 01:51

## 2021-06-16 RX ADMIN — PIPERACILLIN AND TAZOBACTAM 3.38 G: 3; .375 INJECTION, POWDER, LYOPHILIZED, FOR SOLUTION INTRAVENOUS; PARENTERAL at 08:02

## 2021-06-16 RX ADMIN — DOXYCYCLINE 100 MG: 100 TABLET, FILM COATED ORAL at 10:12

## 2021-06-16 ASSESSMENT — ENCOUNTER SYMPTOMS
BLOOD IN STOOL: 0
SINUS PAIN: 0
PHOTOPHOBIA: 0
DIARRHEA: 1
CLAUDICATION: 0
HEMOPTYSIS: 0
PND: 0
WHEEZING: 0
COUGH: 0
POLYDIPSIA: 0
FEVER: 1
NECK PAIN: 0
BRUISES/BLEEDS EASILY: 0
WEAKNESS: 0
CONSTIPATION: 0
HEARTBURN: 0
MYALGIAS: 0
BACK PAIN: 0
ABDOMINAL PAIN: 0
DOUBLE VISION: 0
ORTHOPNEA: 0
BLURRED VISION: 0
STRIDOR: 0
EYE PAIN: 0
SHORTNESS OF BREATH: 1
FOCAL WEAKNESS: 0
ABDOMINAL PAIN: 1
PALPITATIONS: 0
WEIGHT LOSS: 1
VOMITING: 0
HEADACHES: 0
TREMORS: 0
TINGLING: 0
CHILLS: 1
PALPITATIONS: 1
SORE THROAT: 0
SPUTUM PRODUCTION: 0
NAUSEA: 1
DEPRESSION: 0
HALLUCINATIONS: 0
FALLS: 0
DIAPHORESIS: 1
SPEECH CHANGE: 0
FLANK PAIN: 0
DIZZINESS: 0
MYALGIAS: 1

## 2021-06-16 ASSESSMENT — COGNITIVE AND FUNCTIONAL STATUS - GENERAL
SUGGESTED CMS G CODE MODIFIER MOBILITY: CH
SUGGESTED CMS G CODE MODIFIER DAILY ACTIVITY: CH
DAILY ACTIVITIY SCORE: 24
MOBILITY SCORE: 24

## 2021-06-16 ASSESSMENT — LIFESTYLE VARIABLES
HAVE PEOPLE ANNOYED YOU BY CRITICIZING YOUR DRINKING: NO
SUBSTANCE_ABUSE: 0
EVER FELT BAD OR GUILTY ABOUT YOUR DRINKING: NO
HAVE YOU EVER FELT YOU SHOULD CUT DOWN ON YOUR DRINKING: NO
CONSUMPTION TOTAL: NEGATIVE
TOTAL SCORE: 0
TOTAL SCORE: 0
DOES PATIENT WANT TO STOP DRINKING: NO
TOTAL SCORE: 0
EVER HAD A DRINK FIRST THING IN THE MORNING TO STEADY YOUR NERVES TO GET RID OF A HANGOVER: NO
ON A TYPICAL DAY WHEN YOU DRINK ALCOHOL HOW MANY DRINKS DO YOU HAVE: 0
HOW MANY TIMES IN THE PAST YEAR HAVE YOU HAD 5 OR MORE DRINKS IN A DAY: 0
AVERAGE NUMBER OF DAYS PER WEEK YOU HAVE A DRINK CONTAINING ALCOHOL: 0
ALCOHOL_USE: NO

## 2021-06-16 ASSESSMENT — PATIENT HEALTH QUESTIONNAIRE - PHQ9
1. LITTLE INTEREST OR PLEASURE IN DOING THINGS: NOT AT ALL
SUM OF ALL RESPONSES TO PHQ9 QUESTIONS 1 AND 2: 0
2. FEELING DOWN, DEPRESSED, IRRITABLE, OR HOPELESS: NOT AT ALL

## 2021-06-16 ASSESSMENT — PAIN DESCRIPTION - PAIN TYPE
TYPE: ACUTE PAIN
TYPE: ACUTE PAIN

## 2021-06-16 ASSESSMENT — FIBROSIS 4 INDEX
FIB4 SCORE: 2.05
FIB4 SCORE: 2.56

## 2021-06-16 NOTE — CARE PLAN
The patient is Watcher - Medium risk of patient condition declining or worsening    Shift Goals  Clinical Goals: Decrease lactic acid / normalize, prevent complications of sepsis, maintain hymodynamic stability  Patient Goals: Rest, comfort, stop coughing    Progress made toward(s) clinical / shift goals:    Problem: Knowledge Deficit - Standard  Goal: Patient and family/care givers will demonstrate understanding of plan of care, disease process/condition, diagnostic tests and medications  6/16/2021 0505 by Coty Cavazos R.N.  Outcome: Progressing    Problem: Hemodynamics  Goal: Patient's hemodynamics, fluid balance and neurologic status will be stable or improve  Outcome: Progressing     Problem: Fluid Volume  Goal: Fluid volume balance will be maintained  6/16/2021 0505 by Coty Cavazos R.N.  Outcome: Progressing    Problem: Respiratory  Goal: Patient will achieve/maintain optimum respiratory ventilation and gas exchange  6/16/2021 0505 by Coty Cavazos, R.N.  Outcome: Progressing    Problem: Physical Regulation  Goal: Diagnostic test results will improve  Outcome: Progressing       Patient is not progressing towards the following goals: NA

## 2021-06-16 NOTE — CARE PLAN
The patient is Stable - Low risk of patient condition declining or worsening    Shift Goals  Clinical Goals: Decrease lactic acid / normalize, prevent complications of sepsis, maintain hymodynamic stability  Patient Goals: Rest, comfort, stop coughing    Progress made toward(s) clinical / shift goals:        Problem: Knowledge Deficit - Standard  Goal: Patient and family/care givers will demonstrate understanding of plan of care, disease process/condition, diagnostic tests and medications  Outcome: Progressing     Problem: Fluid Volume  Goal: Fluid volume balance will be maintained  Outcome: Progressing     Problem: Respiratory  Goal: Patient will achieve/maintain optimum respiratory ventilation and gas exchange  Outcome: Progressing       Patient is not progressing towards the following goals:

## 2021-06-16 NOTE — ED NOTES
Rounded on pt, pt is alert and orientated, speaking in full sentences, responds to commands and answers appropriately.  Resp are even and unlabored.  No behavioral indicators of pain. Patient updated on wait status, answered questions, addressed needs, pt sitting in the waiting room

## 2021-06-16 NOTE — ASSESSMENT & PLAN NOTE
Patient found to have decreasing hemoglobin and a work-up for this including iron panel, B12, folate and Hb electrophoresis since she has target cells on her CBC

## 2021-06-16 NOTE — H&P
"Hospital Medicine History & Physical Note    Date of Service  6/15/2021    Primary Care Physician  Pcp Pt States None    Consultants  None    Code Status  Full Code    Chief Complaint  Chief Complaint   Patient presents with   • Rash     Pt has had a rash since 6/6 and was seen and told to come back if sxs worsened. Pt was sent home with abx and has been taking them since. Last dose will be tomorrow. Rash has now spread \"to her whole body\".    • Fever     Pt states she has had fevers on and of since. Pt states she had a fever of 100/7 this AM and took tylenol for that.    • Cough     Pt started having a cough last Friday. Pt states the cough is mostly dry.    • Nausea     Pt has had nausea since sxs started.        History of Presenting Illness  45 y.o. female with history of hypertension, hyperlipidemia, diabetes who presented 6/15/2021 with constellation of symptoms of fever, rash, cough, abnormal labs sent by PCP for evaluation.  Patient was recently seen in ER on 6/6/2021 for fever with anterior cervical lymphadenopathy with tonsillar exudates as well as maculopapular rash --sent home with oral doxycycline.  She had extensive work-up ordered but all essentially negative for Lyme disease, [place] spotted fever.  She returned to ER today after being notified by her PCP in [place] to return to ER for evaluation.  Patient resides in [facility], but frequently visit [place].  She admitted to traveling to [place] in early May, denies sick contact at the time.  In ER, she was tachycardic 120-140s, WBC 27.4, LA 4.2>>5.6. Admission requested for further evaluation and treatment.    I added CT soft tissue neck and CT AP with contrast.    ED course: Unasyn 3 g, vancomycin 2 g, Zofran 4 mg IV, morphine 4 mg IV, magnesium sulfate 2 g IV, LR bolus as per sepsis protocol    Review of Systems  Review of Systems   Constitutional: Positive for chills, fever and malaise/fatigue.   HENT: Negative for hearing " "loss and tinnitus.    Eyes: Negative for blurred vision and double vision.   Respiratory: Negative for cough and hemoptysis.    Cardiovascular: Positive for palpitations. Negative for chest pain and leg swelling.   Gastrointestinal: Positive for nausea. Negative for abdominal pain, heartburn and vomiting.   Genitourinary: Positive for frequency and urgency. Negative for dysuria, flank pain and hematuria.   Musculoskeletal: Positive for myalgias. Negative for falls.   Skin: Positive for rash. Negative for itching.   Neurological: Negative for dizziness, speech change, focal weakness, weakness and headaches.   Endo/Heme/Allergies: Negative for environmental allergies. Does not bruise/bleed easily.        Rash   Psychiatric/Behavioral: Negative for depression, substance abuse and suicidal ideas.   All other systems reviewed and are negative.      Past Medical History   has a past medical history of Diabetes (HCC), High cholesterol, Hypertension, and Hypothyroid.    Surgical History   has a past surgical history that includes cholecystectomy (07/2020); hysterectomy radical (2018); and primary c section (1991).     Family History  family history is not on file.   FH of HTN, DM    Social History   reports that she has never smoked. She has never used smokeless tobacco. She reports that she does not drink alcohol and does not use drugs.    Allergies  Allergies   Allergen Reactions   • Acetazolamide      Tachycardia, dyspnea  Tachycardia, dyspnea  (taking meds for high altitude)     • Allopurinol      \"rash\"       Medications  Prior to Admission Medications   Prescriptions Last Dose Informant Patient Reported? Taking?   B Complex Vitamins (VITAMIN B COMPLEX) Tab on hol at 6/6 Patient Yes No   Sig: Take 1 tablet by mouth every day.   SITagliptin (JANUVIA) 50 MG Tab on hold Patient Yes No   Sig: Take 50 mg by mouth every day.   atorvastatin (LIPITOR) 10 MG Tab on hold at 6/6 Patient Yes No   Sig: Take 10 mg by mouth every " day.   cetirizine (ZYRTEC) 10 MG Tab 6/15/2021 at am Patient Yes No   Sig: Take 10 mg by mouth every day.   doxycycline (MONODOX) 100 MG capsule 6/15/2021 at am Patient No No   Sig: Take 1 capsule by mouth 2 times a day for 10 days.   levothyroxine (SYNTHROID) 88 MCG Tab 6/15/2021 at am Patient Yes No   Sig: Take 88 mcg by mouth see administration instructions. Monday-Friday   nebivolol (BYSTOLIC) 10 MG Tab on hold at 6/6 Patient Yes No   Sig: Take 10 mg by mouth every day.      Facility-Administered Medications: None       Physical Exam  Temp:  [35.8 °C (96.5 °F)-38.8 °C (101.8 °F)] 38.7 °C (101.7 °F)  Pulse:  [112-145] 123  Resp:  [16-20] 20  BP: (118-142)/(68-91) 118/78  SpO2:  [92 %-99 %] 96 %    Physical Exam  Vitals and nursing note reviewed.   Constitutional:       Appearance: She is obese.   Neurological:      Mental Status: She is alert.         Laboratory:  Recent Labs     06/15/21  2050 06/16/21  0345   WBC 27.4* 28.9*   RBC 3.65* 3.25*   HEMOGLOBIN 11.2* 10.0*   HEMATOCRIT 32.7* 30.0*   MCV 89.6 92.3   MCH 30.7 30.8   MCHC 34.3 33.3*   RDW 47.8 49.8   PLATELETCT 334 301   MPV 10.5 10.6     Recent Labs     06/15/21  2050   SODIUM 128*   POTASSIUM 4.0   CHLORIDE 93*   CO2 22   GLUCOSE 83   BUN 13   CREATININE 0.94   CALCIUM 8.4*     Recent Labs     06/15/21  2050   ALTSGPT 481*   ASTSGOT 320*   ALKPHOSPHAT 761*   TBILIRUBIN 6.4*   GLUCOSE 83     Recent Labs     06/16/21 0345   INR 1.46*     No results for input(s): NTPROBNP in the last 72 hours.      No results for input(s): TROPONINT in the last 72 hours.    Imaging:  DX-CHEST-LIMITED (1 VIEW)   Final Result         1.  No acute cardiopulmonary disease.      CT-SOFT TISSUE NECK WITH    (Results Pending)   CT-ABDOMEN-PELVIS WITH    (Results Pending)   EC-ECHOCARDIOGRAM COMPLETE W/O CONT    (Results Pending)         Assessment/Plan:  I anticipate this patient will require at least two midnights for appropriate medical management, necessitating inpatient  admission.    * Severe sepsis (HCC)  Assessment & Plan  This is Sepsis Present on admission  SIRS criteria identified on my evaluation include: Fever, with temperature greater than 101 deg F, Tachycardia, with heart rate greater than 90 BPM, Tachypnea, with respirations greater than 20 per minute, Leukocytosis, with WBC greater than 12,000 and Bandemia, greater than 10% bands  LA 4.2>>5.6, WBC 27.4k    Source is soft tissue, possible GI  Sepsis protocol initiated  Fluid resuscitation ordered per protocol  IV antibiotics as appropriate for source of sepsis  While organ dysfunction may be noted elsewhere in this problem list or in the chart, degree of organ dysfunction does not meet CMS criteria for severe sepsis          Hyperbilirubinemia  Assessment & Plan  T.bili 6.4  S/p lap ree 6/2020  ?choldocholithiasis    F/u CT AP w/contrast  On Zosyn    Lactic acidosis  Assessment & Plan  Secondary to sepsis    Pneumonia  Assessment & Plan  Zosyn + azithromycin  F/u BCx, sputum CX    LFT elevation  Assessment & Plan  Possible NAFLD, congestive hepatopathy  Negative HIV, hepatitis panel 6/6    Swollen neck  Assessment & Plan  F/u CT soft tissue neck    Cellulitis  Assessment & Plan  Rash in face, b/l UE, abd, b/L LE  WBC 27.4k, LA 4.2 >>5.6    IVF  Abx: Zosyn + azithromycin  F/u BCx -- de-escalate as appropriate     UTI (urinary tract infection)  Assessment & Plan  UA pyruia, WBC 27.4k  S/p Unasyn + vancomycin in ER    Hypothyroidism  Assessment & Plan  Synthroid    HLD (hyperlipidemia)  Assessment & Plan  Resume statin when LFT improved    HTN (hypertension)  Assessment & Plan  Continue home regimen    Diabetes (HCC)  Assessment & Plan  ISS    Dehydration  Assessment & Plan  IVF    Hyponatremia  Assessment & Plan  Na 128, likely to improve with IVF    Anemia  Assessment & Plan  Chronic, no gross bleeding

## 2021-06-16 NOTE — PROGRESS NOTES
[facility] Medicine Daily Progress Note    Date of Service  6/16/2021    Chief Complaint  45 y.o. female admitted 6/15/2021 with history of hypertension, hyperlipidemia, diabetes who presented 6/15/2021 with constellation of symptoms of fever, maculopapular rash, cough, shortness of breath, abnormal labs.  Patient states that her symptoms started on 6/6 and she returned from [place] in May.  In [place] she was bitten by 3 bugs in her left arm.  Patient had a extensive work-up for Lyme disease, [place] spotted fever.  3 weeks prior to her illness she was started on allopurinol.  1 week prior she was started on ARB for blood pressure.    Hospital Course  In the ED she was started on Unasyn, vancomycin, Zofran, morphine and sepsis bolus.  CT scan of the abdomen found ill-defined low-attenuation lesions in the spleen, hepatic steatosis, bilateral axillary lymph nodes.    Interval Problem Update  6/16: Patient continues to feel fatigued and states that the rash is not improving.  She but has persistent lactic acidosis, eosinophilia, Elevated Procalcitonin, leukocytosis and elevated liver enzymes.  Infectious disease was consulted and required a leptospirosis antigen.  HIV and hepatitis are negative.  In addition I will order ANCA, JOSE.  I performed this skin punch biopsy today and await for results.    Consultants/Specialty  Infectious disease    Code Status  Full Code    Disposition  TBD    Review of Systems  Review of Systems   Constitutional: Positive for chills, diaphoresis, fever, malaise/fatigue and weight loss.   HENT: Negative for congestion, ear discharge, ear pain, hearing loss, nosebleeds, sinus pain, sore throat and tinnitus.    Eyes: Negative for blurred vision, double vision, photophobia and pain.   Respiratory: Positive for shortness of breath. Negative for cough, hemoptysis, sputum production, wheezing and stridor.    Cardiovascular: Negative for chest pain, palpitations, orthopnea, claudication, leg  swelling and PND.   Gastrointestinal: Positive for abdominal pain, diarrhea and nausea. Negative for blood in stool, constipation, heartburn, melena and vomiting.   Genitourinary: Negative for dysuria, flank pain, frequency, hematuria and urgency.   Musculoskeletal: Negative for back pain, falls, joint pain, myalgias and neck pain.   Skin: Positive for rash. Negative for itching.   Neurological: Negative for dizziness, tingling, tremors, weakness and headaches.   Endo/Heme/Allergies: Negative for environmental allergies and polydipsia. Does not bruise/bleed easily.   Psychiatric/Behavioral: Negative for depression, hallucinations, substance abuse and suicidal ideas.        Physical Exam  Temp:  [37.7 °C (99.8 °F)-38.8 °C (101.8 °F)] 37.9 °C (100.3 °F)  Pulse:  [104-145] 104  Resp:  [16-20] 17  BP: ()/(57-91) 93/57  SpO2:  [92 %-99 %] 93 %    Physical Exam  Vitals and nursing note reviewed.   Constitutional:       General: She is not in acute distress.     Appearance: Normal appearance. She is not ill-appearing, toxic-appearing or diaphoretic.   HENT:      Head: Normocephalic and atraumatic.      Nose: No congestion or rhinorrhea.      Mouth/Throat:      Pharynx: No oropharyngeal exudate or posterior oropharyngeal erythema.      Comments: Petechial on the hard palate  Eyes:      General: No scleral icterus.  Neck:      Vascular: No carotid bruit.   Cardiovascular:      Rate and Rhythm: Normal rate and regular rhythm.      Pulses: Normal pulses.      Heart sounds: Normal heart sounds. No murmur heard.   No friction rub. No gallop.    Pulmonary:      Effort: Pulmonary effort is normal. No respiratory distress.      Breath sounds: Normal breath sounds. No stridor. No wheezing or rhonchi.   Abdominal:      General: Abdomen is flat. There is no distension.      Palpations: There is no mass.      Tenderness: There is no abdominal tenderness. There is no left CVA tenderness, guarding or rebound.      Hernia: No hernia  is present.   Musculoskeletal:         General: No swelling. Normal range of motion.      Cervical back: No rigidity. No muscular tenderness.      Right lower leg: No edema.      Left lower leg: No edema.   Lymphadenopathy:      Cervical: Cervical adenopathy present.   Skin:     General: Skin is warm and dry.      Capillary Refill: Capillary refill takes more than 3 seconds.      Coloration: Skin is not jaundiced or pale.      Findings: No bruising or erythema.      Comments: Maculopapular rash throughout all 4 extremities and trunk   Neurological:      Mental Status: She is alert.         Fluids    Intake/Output Summary (Last 24 hours) at 6/16/2021 1537  Last data filed at 6/16/2021 1300  Gross per 24 hour   Intake 2867 ml   Output 500 ml   Net 2367 ml       Laboratory  Recent Labs     06/15/21  2050 06/16/21  0345   WBC 27.4* 28.9*   RBC 3.65* 3.25*   HEMOGLOBIN 11.2* 10.0*   HEMATOCRIT 32.7* 30.0*   MCV 89.6 92.3   MCH 30.7 30.8   MCHC 34.3 33.3*   RDW 47.8 49.8   PLATELETCT 334 301   MPV 10.5 10.6     Recent Labs     06/15/21  2050 06/16/21  0345   SODIUM 128* 129*   POTASSIUM 4.0 4.3   CHLORIDE 93* 95*   CO2 22 20   GLUCOSE 83 82   BUN 13 11   CREATININE 0.94 0.92   CALCIUM 8.4* 7.6*     Recent Labs     06/16/21  0345   INR 1.46*               Imaging  CT-SOFT TISSUE NECK WITH   Final Result      1.  No abscess identified.      2.  Nonspecific increase in the number of cervical lymph nodes without pathologic enlargement.      CT-ABDOMEN-PELVIS WITH   Final Result         1. Ill-defined low-attenuation lesions in the spleen is indeterminate.      2. Status post cholecystectomy. No dilatation of the CBD      3. Hepatic steatosis.      4. Partially visualized mildly prominent bilateral axillary lymph nodes.      DX-CHEST-LIMITED (1 VIEW)   Final Result         1.  No acute cardiopulmonary disease.      EC-ECHOCARDIOGRAM COMPLETE W/O CONT    (Results Pending)        Assessment/Plan  * Rash  Assessment &  Plan  Desquamating Maculopapular rash after drug exposure  Dress vs TEN  Without thrombocytopenia   Eosinophilia   Start oral prednisone   Skin biopsy is pending   Leptospirosis antigen, ANCA, JOSE, cryoglobulin pending   Discontinue antibiotics     Acute liver failure without hepatic coma  Assessment & Plan  Secondary to dress   Continue to trend Liver enzymes, ammonia   Avoid hepatotoxic medications     Hypothyroidism  Assessment & Plan  Synthroid    HLD (hyperlipidemia)  Assessment & Plan  Resume statin when LFT improved    HTN (hypertension)  Assessment & Plan  Continue home regimen    Diabetes (HCC)  Assessment & Plan  ISS    Swollen neck  Assessment & Plan  F/u CT soft tissue neck    Cellulitis  Assessment & Plan  Antibiotics were discontinued by ID  Continue doxycyline   Monitor for secondary infection  Wound consult in place    UTI (urinary tract infection)  Assessment & Plan  Not symptomatic     Dehydration  Assessment & Plan  IVF    Hyponatremia  Assessment & Plan  Hypovolemic hyponatremia  IV fluid hydration  Daily correction by more than 4 to 6 mEq/L should be avoided to minimize the risk of central demyelinating lesions with neurologic dysfunction       Anemia  Assessment & Plan  Chronic, no gross bleeding    LFT elevation  Assessment & Plan  Secondary to dress vs TEN    Lactic acidosis  Assessment & Plan  Secondary to sepsis    Severe sepsis (HCC)  Assessment & Plan  This is Sepsis Present on admission  SIRS criteria identified on my evaluation include: Fever, with temperature greater than 101 deg F, Tachycardia, with heart rate greater than 90 BPM, Tachypnea, with respirations greater than 20 per minute, Leukocytosis, with WBC greater than 12,000 and Bandemia, greater than 10% bands  LA 4.2>>5.6, WBC 27.4k    Source is Dress vs TEN  Sepsis protocol initiated  Fluid resuscitation ordered per protocol  IV antibiotics as appropriate for source of sepsis  While organ dysfunction may be noted elsewhere in  this problem list or in the chart, degree of organ dysfunction does not meet CMS criteria for severe sepsis             VTE prophylaxis: lovenox

## 2021-06-16 NOTE — ASSESSMENT & PLAN NOTE
This is Sepsis Present on admission  SIRS criteria identified on my evaluation include: Fever, with temperature greater than 101 deg F, Tachycardia, with heart rate greater than 90 BPM, Tachypnea, with respirations greater than 20 per minute, Leukocytosis, with WBC greater than 12,000 and Bandemia, greater than 10% bands  LA 4.2>>5.6, WBC 27.4k    Source is Dress vs TEN  Sepsis protocol initiated  Fluid resuscitation ordered per protocol  IV antibiotics as appropriate for source of sepsis  While organ dysfunction may be noted elsewhere in this problem list or in the chart, degree of organ dysfunction does not meet CMS criteria for severe sepsis

## 2021-06-16 NOTE — PROGRESS NOTES
Received bedside report from RN, pt care assumed, VSS: tylenol for mild fever, pt assessment complete. Pt AAOx4, no c/o pain at this time. Pt received medication for rash discomfort. No signs of acute distress noted at this time. POC discussed with pt and verbalizes no questions. Pt denies any additional needs at this time. Bed in lowest position, pt educated on fall risk and verbalized understanding, call light within reach, hourly rounding initiated.

## 2021-06-16 NOTE — PROCEDURES
SUBJECTIVE:   45 y.o. female who presents for rash. We have already discussed this procedure and risks.     OBJECTIVE:   Patient appears well. Vitals are normal.    ASSESSMENT:   Maculopapular rash     PLAN:   After informed consent was obtained, using Betadine for cleansing and 1% Lidocaine , with sterile technique, punch biopsy size 4 mm was performed. Antibiotic dressing is applied, and wound care instructions provided.  Be alert for any signs of cutaneous infection. The procedure was well tolerated without complications. Follow up: the specimen is labeled and sent to pathology for evaluation.

## 2021-06-16 NOTE — ED NOTES
Med Rec complete per Pt at bedside.  Allergies reviewed.  Pt taking Doxycycline BID for a 10 day course. Today would be the 9th day.

## 2021-06-16 NOTE — ED PROVIDER NOTES
"ED Provider Note    CHIEF COMPLAINT  Chief Complaint   Patient presents with   • Rash     Pt has had a rash since 6/6 and was seen and told to come back if sxs worsened. Pt was sent home with abx and has been taking them since. Last dose will be tomorrow. Rash has now spread \"to her whole body\".    • Fever     Pt states she has had fevers on and of since. Pt states she had a fever of 100/7 this AM and took tylenol for that.    • Cough     Pt started having a cough last Friday. Pt states the cough is mostly dry.    • Nausea     Pt has had nausea since sxs started.        HPI  Bekah Morrell is a 45 y.o. female here for evaluation of fever, cough, and nausea.  The pt states this has been ongoing over the last few days, and she was seen and evaluated for the same, on the 6th of this month.  She had a work up done, but decided to leave and go home, on abx.  She completed her abx today, and was still has some issues.  She has no cp, no sob, no vomitig.        ROS  See HPI for further details, o/w negative.     PAST MEDICAL HISTORY   has a past medical history of Diabetes (HCC), High cholesterol, Hypertension, and Hypothyroid.    SOCIAL HISTORY  Social History     Tobacco Use   • Smoking status: Never Smoker   • Smokeless tobacco: Never Used   Substance and Sexual Activity   • Alcohol use: Never   • Drug use: Never   • Sexual activity: Not on file       Family History  No bleeding disorders.    SURGICAL HISTORY   has a past surgical history that includes cholecystectomy (07/2020); hysterectomy radical (2018); and primary c section (1991).    CURRENT MEDICATIONS  Home Medications     Reviewed by Christine Verma R.N. (Registered Nurse) on 06/15/21 at 1529  Med List Status: Not Addressed   Medication Last Dose Status   atorvastatin (LIPITOR) 10 MG Tab  Active   B Complex Vitamins (VITAMIN B COMPLEX) Tab  Active   cetirizine (ZYRTEC) 10 MG Tab  Active   doxycycline (MONODOX) 100 MG capsule  Active " "  levothyroxine (SYNTHROID) 88 MCG Tab  Active   nebivolol (BYSTOLIC) 10 MG Tab  Active   SITagliptin (JANUVIA) 50 MG Tab  Active                ALLERGIES  Allergies   Allergen Reactions   • Acetazolamide      Tachycardia, dyspnea  Tachycardia, dyspnea  (taking meds for high altitude)     • Allopurinol      \"rash\"       REVIEW OF SYSTEMS  See HPI for further details. Review of systems as above, otherwise all other systems are negative.     PHYSICAL EXAM  Constitutional: Well developed, well nourished. mild acute distress.  HEENT: Normocephalic, atraumatic. Posterior pharynx clear and moist.  Eyes:  EOMI. Normal sclera.  Neck: Supple, Full range of motion, nontender.  Chest/Pulmonary: clear to ausculation. Symmetrical expansion.   Cardio: tachy rate and rhythm with no murmur.   Abdomen: Soft, nontender. No peritoneal signs. No guarding. No palpable masses.  Musculoskeletal: No deformity, no edema, neurovascular intact.   Neuro: Clear speech, appropriate, cooperative, cranial nerves II-XII grossly intact.  Psych: anxious  mood and affect      Results for orders placed or performed during the hospital encounter of 06/15/21   LACTIC ACID   Result Value Ref Range    Lactic Acid 4.2 (HH) 0.5 - 2.0 mmol/L   CBC WITH DIFFERENTIAL   Result Value Ref Range    WBC 27.4 (H) 4.8 - 10.8 K/uL    RBC 3.65 (L) 4.20 - 5.40 M/uL    Hemoglobin 11.2 (L) 12.0 - 16.0 g/dL    Hematocrit 32.7 (L) 37.0 - 47.0 %    MCV 89.6 81.4 - 97.8 fL    MCH 30.7 27.0 - 33.0 pg    MCHC 34.3 33.6 - 35.0 g/dL    RDW 47.8 35.9 - 50.0 fL    Platelet Count 334 164 - 446 K/uL    MPV 10.5 9.0 - 12.9 fL    Nucleated RBC 2.80 /100 WBC    NRBC (Absolute) 0.77 K/uL   COMP METABOLIC PANEL   Result Value Ref Range    Sodium 128 (L) 135 - 145 mmol/L    Potassium 4.0 3.6 - 5.5 mmol/L    Chloride 93 (L) 96 - 112 mmol/L    Co2 22 20 - 33 mmol/L    Anion Gap 13.0 7.0 - 16.0    Glucose 83 65 - 99 mg/dL    Bun 13 8 - 22 mg/dL    Creatinine 0.94 0.50 - 1.40 mg/dL    Calcium " 8.4 (L) 8.5 - 10.5 mg/dL    AST(SGOT) 320 (H) 12 - 45 U/L    ALT(SGPT) 481 (H) 2 - 50 U/L    Alkaline Phosphatase 761 (H) 30 - 99 U/L    Total Bilirubin 6.4 (H) 0.1 - 1.5 mg/dL    Albumin 2.5 (L) 3.2 - 4.9 g/dL    Total Protein 7.3 6.0 - 8.2 g/dL    Globulin 4.8 (H) 1.9 - 3.5 g/dL    A-G Ratio 0.5 g/dL   URINALYSIS    Specimen: Urine   Result Value Ref Range    Color DK Yellow     Character Clear     Specific Gravity 1.025 <1.035    Ph 6.0 5.0 - 8.0    Glucose Negative Negative mg/dL    Ketones Trace (A) Negative mg/dL    Protein 100 (A) Negative mg/dL    Bilirubin Large (A) Negative    Urobilinogen, Urine 1.0 Negative    Nitrite Positive (A) Negative    Leukocyte Esterase Moderate (A) Negative    Occult Blood Trace (A) Negative    Micro Urine Req Microscopic    PROCALCITONIN   Result Value Ref Range    Procalcitonin 2.95 (H) <0.25 ng/mL   URINE MICROSCOPIC (W/UA)   Result Value Ref Range    WBC 10-20 (A) /hpf    RBC 2-5 (A) /hpf    Bacteria Few (A) None /hpf    Epithelial Cells Negative /hpf    Epithelial Cells Renal Few /hpf    Mucous Threads Few /hpf    Hyaline Cast 3-5 (A) /lpf   COV-2, FLU A/B, AND RSV BY PCR (2-4 HOURS CEPHEID): Collect NP swab in VTM    Specimen: Respirate   Result Value Ref Range    Influenza virus A RNA Negative Negative    Influenza virus B, PCR Negative Negative    RSV, PCR Negative Negative    SARS-CoV-2 by PCR NotDetected     SARS-CoV-2 Source NP Swab    ESTIMATED GFR   Result Value Ref Range    GFR If African American >60 >60 mL/min/1.73 m 2    GFR If Non African American >60 >60 mL/min/1.73 m 2   PERIPHERAL SMEAR REVIEW   Result Value Ref Range    Peripheral Smear Review see below    POCT glucose device results   Result Value Ref Range    Glucose - Accu-Ck 78 65 - 99 mg/dL     DX-CHEST-LIMITED (1 VIEW)   Final Result         1.  No acute cardiopulmonary disease.            PROCEDURES     MEDICAL RECORD  I have reviewed patient's medical record and pertinent results are  listed.    COURSE & MEDICAL DECISION MAKING  I have reviewed any medical record information, laboratory studies and radiographic results as noted above.      DX-CHEST-LIMITED (1 VIEW)   Final Result         1.  No acute cardiopulmonary disease.          CRITICAL CARE  The very real possibility of a deterioration of this patient's condition required the highest level of my preparedness for sudden, emergent intervention.  I provided critical care services, which included medication orders, frequent reevaluations of the patient's condition and response to treatment, ordering and reviewing test results, and discussing the case with various consultants.  The critical care time associated with the care of the patient was 45 minutes. Review chart for interventions. This time is exclusive of any other billable procedures.       HYDRATION: Based on the patient's presentation of Dehydration the patient was given IV fluids. IV Hydration was used because oral hydration was not adequate alone. Upon recheck following hydration, the patient was improved.    9:46 PM  Pt  Being worked up.  Iv fluids and abx ordered.     10:19 PM  Pt comfortable.      11:03 PM  Dr. Mosley will admit the pt.       FINAL IMPRESSION  uro sepsis   Critical care time 45 minutes.       Electronically signed by: Kye Hi D.O., 6/15/2021 9:47 PM

## 2021-06-16 NOTE — CARE PLAN
The patient is Unstable - High likelihood or risk of patient condition declining or worsening         Progress made toward(s) clinical / shift goals:    Problem: Knowledge Deficit - Standard  Goal: Patient and family/care givers will demonstrate understanding of plan of care, disease process/condition, diagnostic tests and medications  Outcome: Progressing     Problem: Fluid Volume  Goal: Fluid volume balance will be maintained  Outcome: Progressing     Problem: Respiratory  Goal: Patient will achieve/maintain optimum respiratory ventilation and gas exchange  Outcome: Progressing       Patient is not progressing towards the following goals:NA

## 2021-06-16 NOTE — PROGRESS NOTES
Lab result trending down from 5.6 to 4.7 w/ MD notified.       Vielka from lab called with critical result of Lactic acid at 4.7. Critical lab result read back to Vielka.   Dr. Cavazos notified of critical lab result at: 0403.  Critical lab result read back by Dr. Cavazos.

## 2021-06-16 NOTE — ASSESSMENT & PLAN NOTE
Antibiotics were discontinued by ID  Continue doxycyline   Monitor for secondary infection  Wound consult in place

## 2021-06-16 NOTE — ASSESSMENT & PLAN NOTE
Hypovolemic hyponatremia  IV fluid hydration  Daily correction by more than 4 to 6 mEq/L should be avoided to minimize the risk of central demyelinating lesions with neurologic dysfunction

## 2021-06-16 NOTE — PROGRESS NOTES
Bedside report received and patient care assumed. Pt is resting in bed, A&Ox4, with no complaints of pain, and is on RA. Tele box on. Monitor room notified with RR verified. Cardiac monitoring initiated. All fall precautions are in place, belongings at bedside table.  Pt was updated on POC, no questions or concerns. Pt educated on use of call light for assistance.

## 2021-06-16 NOTE — ASSESSMENT & PLAN NOTE
Desquamating Maculopapular rash after drug exposure  Dress vs TEN  Without thrombocytopenia   Eosinophilia   Start oral prednisone will need 8 to 12 weeks   Skin biopsy is pending   Leptospirosis antigen, ANCA, JOSE, cryoglobulin pending   Discontinue antibiotics   Follow up with dermatology as a outpatient

## 2021-06-16 NOTE — ED NOTES
Pt has LR infusing will start Vanco as well. Unasyn is not compatible will start once LR is complete.

## 2021-06-17 ENCOUNTER — APPOINTMENT (OUTPATIENT)
Dept: CARDIOLOGY | Facility: MEDICAL CENTER | Age: 46
DRG: 814 | End: 2021-06-17
Attending: STUDENT IN AN ORGANIZED HEALTH CARE EDUCATION/TRAINING PROGRAM
Payer: COMMERCIAL

## 2021-06-17 PROBLEM — D72.12 DRESS SYNDROME: Status: ACTIVE | Noted: 2021-06-16

## 2021-06-17 PROBLEM — T50.905A DRESS SYNDROME: Status: ACTIVE | Noted: 2021-06-16

## 2021-06-17 LAB
A1AT SERPL-MCNC: 231 MG/DL (ref 90–200)
ALBUMIN SERPL BCP-MCNC: 2.1 G/DL (ref 3.2–4.9)
ALBUMIN/GLOB SERPL: 0.5 G/DL
ALP SERPL-CCNC: 569 U/L (ref 30–99)
ALT SERPL-CCNC: 354 U/L (ref 2–50)
ANION GAP SERPL CALC-SCNC: 9 MMOL/L (ref 7–16)
ANISOCYTOSIS BLD QL SMEAR: ABNORMAL
AST SERPL-CCNC: 232 U/L (ref 12–45)
BACTERIA UR CULT: NORMAL
BASOPHILS # BLD AUTO: 0.9 % (ref 0–1.8)
BASOPHILS # BLD: 0.18 K/UL (ref 0–0.12)
BILIRUB SERPL-MCNC: 5.1 MG/DL (ref 0.1–1.5)
BUN SERPL-MCNC: 12 MG/DL (ref 8–22)
C3 SERPL-MCNC: 84.2 MG/DL (ref 87–200)
C4 SERPL-MCNC: 14.3 MG/DL (ref 19–52)
CALCIUM SERPL-MCNC: 7.5 MG/DL (ref 8.5–10.5)
CHLORIDE SERPL-SCNC: 97 MMOL/L (ref 96–112)
CO2 SERPL-SCNC: 22 MMOL/L (ref 20–33)
CREAT SERPL-MCNC: 0.87 MG/DL (ref 0.5–1.4)
EBV EA-D IGG SER-ACNC: <5 U/ML (ref 0–10.9)
EBV NA IGG SER IA-ACNC: 130 U/ML (ref 0–21.9)
EBV VCA IGG SER IA-ACNC: 127 U/ML (ref 0–21.9)
EBV VCA IGM SER IA-ACNC: <10 U/ML (ref 0–43.9)
EOSINOPHIL # BLD AUTO: 2.57 K/UL (ref 0–0.51)
EOSINOPHIL NFR BLD: 13.1 % (ref 0–6.9)
ERYTHROCYTE [DISTWIDTH] IN BLOOD BY AUTOMATED COUNT: 51.2 FL (ref 35.9–50)
GLOBULIN SER CALC-MCNC: 4.5 G/DL (ref 1.9–3.5)
GLUCOSE BLD-MCNC: 114 MG/DL (ref 65–99)
GLUCOSE BLD-MCNC: 122 MG/DL (ref 65–99)
GLUCOSE BLD-MCNC: 131 MG/DL (ref 65–99)
GLUCOSE BLD-MCNC: 158 MG/DL (ref 65–99)
GLUCOSE BLD-MCNC: 97 MG/DL (ref 65–99)
GLUCOSE SERPL-MCNC: 109 MG/DL (ref 65–99)
HCT VFR BLD AUTO: 26.5 % (ref 37–47)
HGB BLD-MCNC: 8.6 G/DL (ref 12–16)
LV EJECT FRACT  99904: 65
LV EJECT FRACT MOD 2C 99903: 68.15
LV EJECT FRACT MOD 4C 99902: 64.16
LV EJECT FRACT MOD BP 99901: 66.28
LYMPHOCYTES # BLD AUTO: 1.72 K/UL (ref 1–4.8)
LYMPHOCYTES NFR BLD: 8.8 % (ref 22–41)
M PNEUMO IGM SER IA-ACNC: 0.34 U/L
M PNEUMO IGM SER IA-ACNC: 0.48 U/L
MACROCYTES BLD QL SMEAR: ABNORMAL
MANUAL DIFF BLD: NORMAL
MCH RBC QN AUTO: 30.4 PG (ref 27–33)
MCHC RBC AUTO-ENTMCNC: 32.5 G/DL (ref 33.6–35)
MCV RBC AUTO: 93.6 FL (ref 81.4–97.8)
METAMYELOCYTES NFR BLD MANUAL: 0.9 %
MONOCYTES # BLD AUTO: 0 K/UL (ref 0–0.85)
MONOCYTES NFR BLD AUTO: 0 % (ref 0–13.4)
MORPHOLOGY BLD-IMP: NORMAL
MYELOCYTES NFR BLD MANUAL: 0.9 %
NEUTROPHILS # BLD AUTO: 14.78 K/UL (ref 2–7.15)
NEUTROPHILS NFR BLD: 74.5 % (ref 44–72)
NEUTS BAND NFR BLD MANUAL: 0.9 % (ref 0–10)
NRBC # BLD AUTO: 0.14 K/UL
NRBC BLD-RTO: 0.7 /100 WBC
NUCLEAR IGG SER QL IA: NORMAL
PLATELET # BLD AUTO: 212 K/UL (ref 164–446)
PLATELET BLD QL SMEAR: NORMAL
PMV BLD AUTO: 12.4 FL (ref 9–12.9)
POLYCHROMASIA BLD QL SMEAR: NORMAL
POTASSIUM SERPL-SCNC: 4.1 MMOL/L (ref 3.6–5.5)
PROT SERPL-MCNC: 6.6 G/DL (ref 6–8.2)
RBC # BLD AUTO: 2.83 M/UL (ref 4.2–5.4)
RBC BLD AUTO: PRESENT
S PNEUM AG UR QL: NEGATIVE
SIGNIFICANT IND 70042: NORMAL
SITE SITE: NORMAL
SODIUM SERPL-SCNC: 128 MMOL/L (ref 135–145)
SOURCE SOURCE: NORMAL
TREPONEMA PALLIDUM IGG+IGM AB [PRESENCE] IN SERUM OR PLASMA BY IMMUNOASSAY: NORMAL
WBC # BLD AUTO: 19.6 K/UL (ref 4.8–10.8)

## 2021-06-17 PROCEDURE — 86160 COMPLEMENT ANTIGEN: CPT | Mod: 91

## 2021-06-17 PROCEDURE — 700111 HCHG RX REV CODE 636 W/ 250 OVERRIDE (IP): Performed by: STUDENT IN AN ORGANIZED HEALTH CARE EDUCATION/TRAINING PROGRAM

## 2021-06-17 PROCEDURE — A9270 NON-COVERED ITEM OR SERVICE: HCPCS | Performed by: STUDENT IN AN ORGANIZED HEALTH CARE EDUCATION/TRAINING PROGRAM

## 2021-06-17 PROCEDURE — 700105 HCHG RX REV CODE 258: Performed by: HOSPITALIST

## 2021-06-17 PROCEDURE — 80053 COMPREHEN METABOLIC PANEL: CPT

## 2021-06-17 PROCEDURE — 93306 TTE W/DOPPLER COMPLETE: CPT

## 2021-06-17 PROCEDURE — 85027 COMPLETE CBC AUTOMATED: CPT

## 2021-06-17 PROCEDURE — 82962 GLUCOSE BLOOD TEST: CPT | Mod: 91

## 2021-06-17 PROCEDURE — 700102 HCHG RX REV CODE 250 W/ 637 OVERRIDE(OP): Performed by: STUDENT IN AN ORGANIZED HEALTH CARE EDUCATION/TRAINING PROGRAM

## 2021-06-17 PROCEDURE — 700102 HCHG RX REV CODE 250 W/ 637 OVERRIDE(OP): Performed by: INTERNAL MEDICINE

## 2021-06-17 PROCEDURE — 85007 BL SMEAR W/DIFF WBC COUNT: CPT

## 2021-06-17 PROCEDURE — 700111 HCHG RX REV CODE 636 W/ 250 OVERRIDE (IP): Performed by: HOSPITALIST

## 2021-06-17 PROCEDURE — 36415 COLL VENOUS BLD VENIPUNCTURE: CPT

## 2021-06-17 PROCEDURE — 700102 HCHG RX REV CODE 250 W/ 637 OVERRIDE(OP): Performed by: HOSPITALIST

## 2021-06-17 PROCEDURE — 93306 TTE W/DOPPLER COMPLETE: CPT | Mod: 26 | Performed by: INTERNAL MEDICINE

## 2021-06-17 PROCEDURE — A9270 NON-COVERED ITEM OR SERVICE: HCPCS | Performed by: HOSPITALIST

## 2021-06-17 PROCEDURE — 770020 HCHG ROOM/CARE - TELE (206)

## 2021-06-17 PROCEDURE — A9270 NON-COVERED ITEM OR SERVICE: HCPCS | Performed by: INTERNAL MEDICINE

## 2021-06-17 PROCEDURE — 99232 SBSQ HOSP IP/OBS MODERATE 35: CPT | Performed by: HOSPITALIST

## 2021-06-17 RX ADMIN — SODIUM CHLORIDE, POTASSIUM CHLORIDE, SODIUM LACTATE AND CALCIUM CHLORIDE: 600; 310; 30; 20 INJECTION, SOLUTION INTRAVENOUS at 10:05

## 2021-06-17 RX ADMIN — Medication 1 TABLET: at 05:53

## 2021-06-17 RX ADMIN — CETIRIZINE HYDROCHLORIDE 10 MG: 10 TABLET, FILM COATED ORAL at 05:52

## 2021-06-17 RX ADMIN — ENOXAPARIN SODIUM 40 MG: 40 INJECTION SUBCUTANEOUS at 05:53

## 2021-06-17 RX ADMIN — METOPROLOL TARTRATE 50 MG: 50 TABLET, FILM COATED ORAL at 05:52

## 2021-06-17 RX ADMIN — DOXYCYCLINE 100 MG: 100 TABLET, FILM COATED ORAL at 05:52

## 2021-06-17 RX ADMIN — LEVOTHYROXINE SODIUM 88 MCG: 0.09 TABLET ORAL at 05:53

## 2021-06-17 RX ADMIN — PREDNISONE 80 MG: 20 TABLET ORAL at 05:52

## 2021-06-17 RX ADMIN — DOXYCYCLINE 100 MG: 100 TABLET, FILM COATED ORAL at 17:20

## 2021-06-17 ASSESSMENT — ENCOUNTER SYMPTOMS
FALLS: 0
WHEEZING: 0
NAUSEA: 1
ORTHOPNEA: 0
TREMORS: 0
CHILLS: 1
EYE PAIN: 0
CLAUDICATION: 0
HALLUCINATIONS: 0
VOMITING: 0
BACK PAIN: 0
DEPRESSION: 0
SINUS PAIN: 0
WEAKNESS: 0
DIARRHEA: 1
FEVER: 1
BLOOD IN STOOL: 0
SHORTNESS OF BREATH: 1
DIZZINESS: 0
HEARTBURN: 0
SPUTUM PRODUCTION: 0
BRUISES/BLEEDS EASILY: 0
DOUBLE VISION: 0
PND: 0
HEMOPTYSIS: 0
ABDOMINAL PAIN: 1
CONSTIPATION: 0
MYALGIAS: 0
WEIGHT LOSS: 1
DIAPHORESIS: 1
PALPITATIONS: 0
FLANK PAIN: 0
HEADACHES: 0
BLURRED VISION: 0
STRIDOR: 0
TINGLING: 0
NECK PAIN: 0
POLYDIPSIA: 0
COUGH: 0
PHOTOPHOBIA: 0
SORE THROAT: 0

## 2021-06-17 ASSESSMENT — PATIENT HEALTH QUESTIONNAIRE - PHQ9
1. LITTLE INTEREST OR PLEASURE IN DOING THINGS: NOT AT ALL
2. FEELING DOWN, DEPRESSED, IRRITABLE, OR HOPELESS: NOT AT ALL
SUM OF ALL RESPONSES TO PHQ9 QUESTIONS 1 AND 2: 0

## 2021-06-17 ASSESSMENT — PAIN DESCRIPTION - PAIN TYPE: TYPE: ACUTE PAIN

## 2021-06-17 ASSESSMENT — LIFESTYLE VARIABLES: SUBSTANCE_ABUSE: 0

## 2021-06-17 NOTE — PROGRESS NOTES
Bedside report received and patient care assumed. Pt is resting in bed, A&Ox4, with no complaints of pain, and is on RA. Tele box on. All fall precautions are in place, belongings at bedside table.  Pt was updated on POC, no questions or concerns. Pt educated on use of call light for assistance.

## 2021-06-17 NOTE — CARE PLAN
The patient is Stable - Low risk of patient condition declining or worsening    Shift Goals  Clinical Goals: Safety, maintain adequate oxygenation, ventilation, perfusion  Patient Goals: Comfort, sleep  Family Goals: NA    Progress made toward(s) clinical / shift goals:    Problem: Hemodynamics  Goal: Patient's hemodynamics, fluid balance and neurologic status will be stable or improve  Outcome: Progressing     Problem: Fluid Volume  Goal: Fluid volume balance will be maintained  Outcome: Progressing     Problem: Respiratory  Goal: Patient will achieve/maintain optimum respiratory ventilation and gas exchange  Outcome: Progressing     Problem: Physical Regulation  Goal: Diagnostic test results will improve  Outcome: Progressing       Patient is not progressing towards the following goals:NA

## 2021-06-17 NOTE — PROGRESS NOTES
[facility] Daily Progress Note    Date of Service  6/17/2021    Chief Complaint  45 y.o. female admitted 6/15/2021 with history of hypertension, hyperlipidemia, diabetes who presented 6/15/2021 with constellation of symptoms of fever, maculopapular rash, cough, shortness of breath, abnormal labs.  Patient states that her symptoms started on 6/6 and she returned from [place] in May.  In [place] she was bitten by 3 bugs in her left arm.  Patient had a extensive work-up for Lyme disease, Whitney spotted fever.  3 weeks prior to her illness she was started on allopurinol.  1 week prior she was started on ARB for blood pressure.    Hospital Course  In the ED she was started on Unasyn, vancomycin, Zofran, morphine and sepsis bolus.  CT scan of the abdomen found ill-defined low-attenuation lesions in the spleen, hepatic steatosis, bilateral axillary lymph nodes.    Interval Problem Update  6/16: Patient continues to feel fatigued and states that the rash is not improving.  She but has persistent lactic acidosis, eosinophilia, Elevated Procalcitonin, leukocytosis and elevated liver enzymes.  Infectious disease was consulted and required a leptospirosis antigen.  HIV and hepatitis are negative.  In addition I will order ANCA, JOSE.  I performed this skin punch biopsy today and await for results.  6/17: Significant improvement in patient's rash and symptoms today after steroids started.  Skin biopsy was also still pending.  Various lab work including leptospirosis is still pending. ID recommended for her to complete her course of doxycyline.   Consultants/Specialty  Infectious disease    Code Status  Full Code    Disposition  TBD    Review of Systems  Review of Systems   Constitutional: Positive for chills, diaphoresis, fever, malaise/fatigue and weight loss.   HENT: Negative for congestion, ear discharge, ear pain, hearing loss, nosebleeds, sinus pain, sore throat and tinnitus.    Eyes: Negative for blurred vision,  double vision, photophobia and pain.   Respiratory: Positive for shortness of breath. Negative for cough, hemoptysis, sputum production, wheezing and stridor.    Cardiovascular: Negative for chest pain, palpitations, orthopnea, claudication, leg swelling and PND.   Gastrointestinal: Positive for abdominal pain, diarrhea and nausea. Negative for blood in stool, constipation, heartburn, melena and vomiting.   Genitourinary: Negative for dysuria, flank pain, frequency, hematuria and urgency.   Musculoskeletal: Negative for back pain, falls, joint pain, myalgias and neck pain.   Skin: Positive for rash. Negative for itching.   Neurological: Negative for dizziness, tingling, tremors, weakness and headaches.   Endo/Heme/Allergies: Negative for environmental allergies and polydipsia. Does not bruise/bleed easily.   Psychiatric/Behavioral: Negative for depression, hallucinations, substance abuse and suicidal ideas.        Physical Exam  Temp:  [36 °C (96.8 °F)-37.4 °C (99.3 °F)] 36.6 °C (97.9 °F)  Pulse:  [] 92  Resp:  [16-18] 16  BP: (107-123)/(59-74) 108/64  SpO2:  [94 %-99 %] 99 %    Physical Exam  Vitals and nursing note reviewed.   Constitutional:       General: She is not in acute distress.     Appearance: Normal appearance. She is not ill-appearing, toxic-appearing or diaphoretic.   HENT:      Head: Normocephalic and atraumatic.      Nose: No congestion or rhinorrhea.      Mouth/Throat:      Pharynx: No oropharyngeal exudate or posterior oropharyngeal erythema.      Comments: Petechial on the hard palate  Eyes:      General: No scleral icterus.  Neck:      Vascular: No carotid bruit.   Cardiovascular:      Rate and Rhythm: Normal rate and regular rhythm.      Pulses: Normal pulses.      Heart sounds: Normal heart sounds. No murmur heard.   No friction rub. No gallop.    Pulmonary:      Effort: Pulmonary effort is normal. No respiratory distress.      Breath sounds: Normal breath sounds. No stridor. No wheezing  or rhonchi.   Abdominal:      General: Abdomen is flat. There is no distension.      Palpations: There is no mass.      Tenderness: There is no abdominal tenderness. There is no left CVA tenderness, guarding or rebound.      Hernia: No hernia is present.   Musculoskeletal:         General: No swelling. Normal range of motion.      Cervical back: No rigidity. No muscular tenderness.      Right lower leg: No edema.      Left lower leg: No edema.   Lymphadenopathy:      Cervical: Cervical adenopathy present.   Skin:     General: Skin is warm and dry.      Capillary Refill: Capillary refill takes more than 3 seconds.      Coloration: Skin is not jaundiced or pale.      Findings: No bruising or erythema.      Comments: Maculopapular rash throughout all 4 extremities and trunk   Neurological:      Mental Status: She is alert.         Fluids    Intake/Output Summary (Last 24 hours) at 6/17/2021 1243  Last data filed at 6/17/2021 1005  Gross per 24 hour   Intake 860 ml   Output 1000 ml   Net -140 ml       Laboratory  Recent Labs     06/15/21  2050 06/16/21  0345 06/17/21  0229   WBC 27.4* 28.9* 19.6*   RBC 3.65* 3.25* 2.83*   HEMOGLOBIN 11.2* 10.0* 8.6*   HEMATOCRIT 32.7* 30.0* 26.5*   MCV 89.6 92.3 93.6   MCH 30.7 30.8 30.4   MCHC 34.3 33.3* 32.5*   RDW 47.8 49.8 51.2*   PLATELETCT 334 301 212   MPV 10.5 10.6 12.4     Recent Labs     06/15/21  2050 06/16/21  0345 06/17/21  0229   SODIUM 128* 129* 128*   POTASSIUM 4.0 4.3 4.1   CHLORIDE 93* 95* 97   CO2 22 20 22   GLUCOSE 83 82 109*   BUN 13 11 12   CREATININE 0.94 0.92 0.87   CALCIUM 8.4* 7.6* 7.5*     Recent Labs     06/16/21 0345   INR 1.46*               Imaging  CT-SOFT TISSUE NECK WITH   Final Result      1.  No abscess identified.      2.  Nonspecific increase in the number of cervical lymph nodes without pathologic enlargement.      CT-ABDOMEN-PELVIS WITH   Final Result         1. Ill-defined low-attenuation lesions in the spleen is indeterminate.      2. Status  post cholecystectomy. No dilatation of the CBD      3. Hepatic steatosis.      4. Partially visualized mildly prominent bilateral axillary lymph nodes.      DX-CHEST-LIMITED (1 VIEW)   Final Result         1.  No acute cardiopulmonary disease.      EC-ECHOCARDIOGRAM COMPLETE W/O CONT    (Results Pending)        Assessment/Plan  * DRESS syndrome  Assessment & Plan  Desquamating Maculopapular rash after drug exposure  Dress vs TEN  Without thrombocytopenia   Eosinophilia   Start oral prednisone will need 8 to 12 weeks   Skin biopsy is pending   Leptospirosis antigen, ANCA, JOSE, cryoglobulin pending   Discontinue antibiotics   Follow up with dermatology as a outpatient     Acute liver failure without hepatic coma  Assessment & Plan  Secondary to dress   Continue to trend Liver enzymes, ammonia   Avoid hepatotoxic medications     Hypothyroidism  Assessment & Plan  Synthroid    HLD (hyperlipidemia)  Assessment & Plan  Resume statin when LFT improved    HTN (hypertension)  Assessment & Plan  Continue home regimen    Diabetes (HCC)  Assessment & Plan  ISS    Swollen neck  Assessment & Plan  F/u CT soft tissue neck    Cellulitis  Assessment & Plan  Antibiotics were discontinued by ID  Continue doxycyline   Monitor for secondary infection  Wound consult in place    UTI (urinary tract infection)  Assessment & Plan  Not symptomatic     Dehydration  Assessment & Plan  IVF    Hyponatremia  Assessment & Plan  Hypovolemic hyponatremia  IV fluid hydration  Daily correction by more than 4 to 6 mEq/L should be avoided to minimize the risk of central demyelinating lesions with neurologic dysfunction       Anemia  Assessment & Plan  Chronic, no gross bleeding    LFT elevation  Assessment & Plan  Secondary to dress vs TEN    Lactic acidosis  Assessment & Plan  Secondary to sepsis    Severe sepsis (HCC)  Assessment & Plan  This is Sepsis Present on admission  SIRS criteria identified on my evaluation include: Fever, with temperature  greater than 101 deg F, Tachycardia, with heart rate greater than 90 BPM, Tachypnea, with respirations greater than 20 per minute, Leukocytosis, with WBC greater than 12,000 and Bandemia, greater than 10% bands  LA 4.2>>5.6, WBC 27.4k    Source is Dress vs TEN  Sepsis protocol initiated  Fluid resuscitation ordered per protocol  IV antibiotics as appropriate for source of sepsis  While organ dysfunction may be noted elsewhere in this problem list or in the chart, degree of organ dysfunction does not meet CMS criteria for severe sepsis             VTE prophylaxis: lovenox
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
## 2021-06-17 NOTE — WOUND TEAM
"[facility]  Inpatient Services  Initial Wound and Skin Care Evaluation    Admission Date: 6/15/2021     Last order of IP CONSULT TO WOUND CARE was found on 6/16/2021 from Hospital Encounter on 6/15/2021     HPI, PMH, SH: Reviewed    Past Surgical History:   Procedure Laterality Date   • CHOLECYSTECTOMY  07/2020   • HYSTERECTOMY RADICAL  2018   • PRIMARY C SECTION  1991     Social History     Tobacco Use   • Smoking status: Never Smoker   • Smokeless tobacco: Never Used   Substance Use Topics   • Alcohol use: Never     Chief Complaint   Patient presents with   • Rash     Pt has had a rash since 6/6 and was seen and told to come back if sxs worsened. Pt was sent home with abx and has been taking them since. Last dose will be tomorrow. Rash has now spread \"to her whole body\".    • Fever     Pt states she has had fevers on and of since. Pt states she had a fever of 100/7 this AM and took tylenol for that.    • Cough     Pt started having a cough last Friday. Pt states the cough is mostly dry.    • Nausea     Pt has had nausea since sxs started.      Diagnosis: Sepsis (HCC) [A41.9]    Unit where seen by Wound Team: T711/02     WOUND CONSULT/FOLLOW UP RELATED TO:  Generalized rash      WOUND HISTORY:  Per MD note: 45 y.o. female admitted 6/15/2021 with history of hypertension, hyperlipidemia, diabetes who presented 6/15/2021 with constellation of symptoms of fever, maculopapular rash, cough, shortness of breath, abnormal labs.  Patient states that her symptoms started on 6/6 and she returned from [place] in May.  In [place] she was bitten by 3 bugs in her left arm.  Patient had a extensive work-up for Lyme disease, [place] spotted fever.  3 weeks prior to her illness she was started on allopurinol.  1 week prior she was started on ARB for blood pressure.  Pt is being followed by ID, HIV And Hepatitis are negative. Skin biopsy obtained 6/16. Leptospirosis antigen, ANCA, JOSE, cryoglobulin are pending. " Suspecting DRESS vs TEN.      WOUND ASSESSMENT/LDA  Wound 06/17/21 Other (comment) Generalized rash, BUE, BLE, FACE, BACK, MOUTH (Active)            Site Assessment Dry    Periwound Assessment Dry;Rash    Closure None    Drainage Amount None    Treatments Site care    Wound Cleansing Not Applicable    Periwound Protectant Moisture Barrier    Dressing Cleansing/Solutions Not Applicable    Dressing Options Open to Air    Dressing Changed Changed    Dressing Status Intact    Dressing Change/Treatment Frequency As Needed    NEXT Weekly Photo (Inpatient Only) 06/24/21    Wound Odor None    Pulses N/A    Exposed Structures None    Number of days: 0        Vascular:    KEM:   No results found.    Lab Values:    Lab Results   Component Value Date/Time    WBC 19.6 (H) 06/17/2021 02:29 AM    RBC 2.83 (L) 06/17/2021 02:29 AM    HEMOGLOBIN 8.6 (L) 06/17/2021 02:29 AM    HEMATOCRIT 26.5 (L) 06/17/2021 02:29 AM    SEDRATEWES 77 (H) 06/16/2021 12:26 PM    HBA1C 6.7 (H) 05/18/2021 08:26 AM        Culture Results show:  No results found for this or any previous visit (from the past 720 hour(s)).    Pain Level/Medicated:  Generalized discomfort       INTERVENTIONS BY WOUND TEAM:  Chart and images reviewed. Discussed with bedside RN. All areas of concern (based on picture review, LDA review and discussion with bedside RN) have been thoroughly assessed. Documentation of areas based on significant findings. This RN in to assess patient. Performed standard wound care which includes appropriate positioning, dressing removal and non-selective debridement. Pictures and measurements obtained weekly if/when required.  Preparation for Dressing removal: NA  Cleansed with:  NA.  Sharp debridement: NA  Rozina wound: aloe vera, Triad cream  Primary Dressing:NABILA      Interdisciplinary consultation: Patient, Bedside RN     EVALUATION / RATIONALE FOR TREATMENT:  Most Recent Date:  6/17: Pt presenting with desquamating maculopapular rash on BUE, BLE,  trunk and face. Etiology unknown at this time. Pt receiving oral prednisone. She reports generalized discomfort most prominent to her face and and oral mucosa. Recommend the use of aloe vera gel and allow skin to absorb until dry, then apply layer of Triad ointment to encourage light autolytic debridement of difficult-to-dress areas with dry, flaky skin. Pt declined aloe vera, reporting slight stinging sensation when used. Opted for Triad cream alone. Pt applied around mouth and nose area where creasing is more likely to occur. Pt instructed to keep cream away from eyes and oral mucosa. Recommend cleansing with warm washcloth or saline and gauze if washcloth is too rough on skin. BUE and BLE with visible rash but no open wounds. Care orders placed. Wound team signing off. Nursing to re-consult as needed if rash worsens or fails to progress.    Triad ointment ingredient: Petrolatum, celluloses gum, zinc oxide, dimethicone.     Goals: Steady decrease in wound area and depth weekly.    WOUND TEAM PLAN OF CARE ([X] for frequency of wound follow up,):   Nursing to follow orders written for wound care. Contact wound team if area fails to progress, deteriorates or with any questions/concerns  Dressing changes by wound team:                   Follow up 3 times weekly:                NPWT change 3 times weekly:     Follow up 1-2 times weekly:      Follow up Bi-Monthly:                   Follow up as needed:   X nursing to manage, reconsult PRN  Other (explain):     NURSING PLAN OF CARE ORDERS (X):  Dressing changes: See Dressing Care orders: x  Skin care: See Skin Care orders: x  RN Prevention Protocol:   Rectal tube care: See Rectal Tube Care orders:   Other orders:    RSKIN:   CURRENTLY IN PLACE (X), APPLIED THIS VISIT (A), ORDERED (O):   Q shift Ken:  X  Q shift pressure point assessments:  X    Surface/Positioning   Pressure redistribution mattress   x         Low Airloss          Bariatric foam      Bariatric BETI      Waffle cushion        Waffle Overlay          Reposition q 2 hours      TAPs Turning system     Z Omid Pillow     Offloading/Redistribution NA  Sacral Mepilex (Silicone dressing)     Heel Mepilex (Silicone dressing)         Heel float boots (Prevalon boot)             Float Heels off Bed with Pillows           Respiratory NA  Silicone O2 tubing         Gray Foam Ear protectors     Cannula fixation Device (Tender )          High flow offloading Clip    Elastic head band offloading device      Anchorfast                                                         Trach with Optifoam split foam             Containment/Moisture Prevention NA    Rectal tube or BMS    Purwick/Condom Cath        Ferguson Catheter    Barrier wipes           Barrier paste       Antifungal tx      Interdry        Mobilization       Up to chair        Ambulate  x    PT/OT      Nutrition       Dietician        Diabetes Education      PO x    TF     TPN     NPO   # days     Other        Anticipated discharge plans: TBD  LTACH:        SNF/Rehab:                  Home Health Care:           Outpatient Wound Center:            Self/Family Care:        Other:

## 2021-06-18 LAB
ALBUMIN SERPL BCP-MCNC: 2.1 G/DL (ref 3.2–4.9)
ALBUMIN/GLOB SERPL: 0.5 G/DL
ALP SERPL-CCNC: 534 U/L (ref 30–99)
ALT SERPL-CCNC: 250 U/L (ref 2–50)
ANCA IGG TITR SER IF: NORMAL {TITER}
ANCA IGG TITR SER IF: NORMAL {TITER}
ANION GAP SERPL CALC-SCNC: 7 MMOL/L (ref 7–16)
ANISOCYTOSIS BLD QL SMEAR: ABNORMAL
AST SERPL-CCNC: 95 U/L (ref 12–45)
BACTERIA STL CULT: NORMAL
BASOPHILS # BLD AUTO: 0.9 % (ref 0–1.8)
BASOPHILS # BLD: 0.16 K/UL (ref 0–0.12)
BILIRUB SERPL-MCNC: 3.4 MG/DL (ref 0.1–1.5)
BUN SERPL-MCNC: 13 MG/DL (ref 8–22)
C JEJUNI+C COLI AG STL QL: NORMAL
CALCIUM SERPL-MCNC: 7.5 MG/DL (ref 8.5–10.5)
CHLORIDE SERPL-SCNC: 99 MMOL/L (ref 96–112)
CO2 SERPL-SCNC: 24 MMOL/L (ref 20–33)
CREAT SERPL-MCNC: 0.72 MG/DL (ref 0.5–1.4)
EOSINOPHIL # BLD AUTO: 0.62 K/UL (ref 0–0.51)
EOSINOPHIL NFR BLD: 3.4 % (ref 0–6.9)
ERYTHROCYTE [DISTWIDTH] IN BLOOD BY AUTOMATED COUNT: 51.4 FL (ref 35.9–50)
FERRITIN SERPL-MCNC: 2543 NG/ML (ref 10–291)
FOLATE SERPL-MCNC: 18.9 NG/ML
GLOBULIN SER CALC-MCNC: 4.2 G/DL (ref 1.9–3.5)
GLUCOSE BLD-MCNC: 134 MG/DL (ref 65–99)
GLUCOSE BLD-MCNC: 138 MG/DL (ref 65–99)
GLUCOSE SERPL-MCNC: 114 MG/DL (ref 65–99)
HCT VFR BLD AUTO: 25.1 % (ref 37–47)
HGB BLD-MCNC: 8 G/DL (ref 12–16)
HGB RETIC QN AUTO: 34.5 PG/CELL (ref 29–35)
IMM RETICS NFR: 50.3 % (ref 9.3–17.4)
IRON SATN MFR SERPL: 84 % (ref 15–55)
IRON SERPL-MCNC: 138 UG/DL (ref 40–170)
L PNEUMO AG UR QL IA: NEGATIVE
LYMPHOCYTES # BLD AUTO: 0.94 K/UL (ref 1–4.8)
LYMPHOCYTES NFR BLD: 5.2 % (ref 22–41)
MACROCYTES BLD QL SMEAR: ABNORMAL
MANUAL DIFF BLD: NORMAL
MCH RBC QN AUTO: 30.2 PG (ref 27–33)
MCHC RBC AUTO-ENTMCNC: 31.9 G/DL (ref 33.6–35)
MCV RBC AUTO: 94.7 FL (ref 81.4–97.8)
MITOCHONDRIA M2 IGG SER-ACNC: 4.7 UNITS (ref 0–24.9)
MONOCYTES # BLD AUTO: 0.47 K/UL (ref 0–0.85)
MONOCYTES NFR BLD AUTO: 2.6 % (ref 0–13.4)
MORPHOLOGY BLD-IMP: NORMAL
NEUTROPHILS # BLD AUTO: 15.91 K/UL (ref 2–7.15)
NEUTROPHILS NFR BLD: 87.9 % (ref 44–72)
NRBC # BLD AUTO: 0.19 K/UL
NRBC BLD-RTO: 1.1 /100 WBC
NUCLEAR IGG SER QL IA: NORMAL
PLATELET # BLD AUTO: 267 K/UL (ref 164–446)
PLATELET BLD QL SMEAR: NORMAL
PMV BLD AUTO: 10.7 FL (ref 9–12.9)
POIKILOCYTOSIS BLD QL SMEAR: NORMAL
POLYCHROMASIA BLD QL SMEAR: NORMAL
POTASSIUM SERPL-SCNC: 3.9 MMOL/L (ref 3.6–5.5)
PROT SERPL-MCNC: 6.3 G/DL (ref 6–8.2)
RBC # BLD AUTO: 2.65 M/UL (ref 4.2–5.4)
RBC BLD AUTO: PRESENT
RETICS # AUTO: 0.17 M/UL (ref 0.04–0.06)
RETICS/RBC NFR: 5.9 % (ref 0.8–2.1)
SIGNIFICANT IND 70042: NORMAL
SITE SITE: NORMAL
SMA IGG SER-ACNC: 24 UNITS (ref 0–19)
SMOOTH MUSCLE IGG TITR SER: ABNORMAL {TITER}
SODIUM SERPL-SCNC: 130 MMOL/L (ref 135–145)
SOURCE SOURCE: NORMAL
TARGETS BLD QL SMEAR: NORMAL
TIBC SERPL-MCNC: 164 UG/DL (ref 250–450)
UIBC SERPL-MCNC: 26 UG/DL (ref 110–370)
VIT B12 SERPL-MCNC: 1751 PG/ML (ref 211–911)
WBC # BLD AUTO: 18.1 K/UL (ref 4.8–10.8)

## 2021-06-18 PROCEDURE — 700102 HCHG RX REV CODE 250 W/ 637 OVERRIDE(OP): Performed by: INTERNAL MEDICINE

## 2021-06-18 PROCEDURE — 700102 HCHG RX REV CODE 250 W/ 637 OVERRIDE(OP): Performed by: HOSPITALIST

## 2021-06-18 PROCEDURE — 83021 HEMOGLOBIN CHROMOTOGRAPHY: CPT

## 2021-06-18 PROCEDURE — A9270 NON-COVERED ITEM OR SERVICE: HCPCS | Performed by: INTERNAL MEDICINE

## 2021-06-18 PROCEDURE — A9270 NON-COVERED ITEM OR SERVICE: HCPCS | Performed by: STUDENT IN AN ORGANIZED HEALTH CARE EDUCATION/TRAINING PROGRAM

## 2021-06-18 PROCEDURE — 80053 COMPREHEN METABOLIC PANEL: CPT

## 2021-06-18 PROCEDURE — 770006 HCHG ROOM/CARE - MED/SURG/GYN SEMI*

## 2021-06-18 PROCEDURE — 82746 ASSAY OF FOLIC ACID SERUM: CPT

## 2021-06-18 PROCEDURE — 83550 IRON BINDING TEST: CPT

## 2021-06-18 PROCEDURE — 82962 GLUCOSE BLOOD TEST: CPT

## 2021-06-18 PROCEDURE — 85007 BL SMEAR W/DIFF WBC COUNT: CPT

## 2021-06-18 PROCEDURE — 83540 ASSAY OF IRON: CPT

## 2021-06-18 PROCEDURE — 700111 HCHG RX REV CODE 636 W/ 250 OVERRIDE (IP): Performed by: STUDENT IN AN ORGANIZED HEALTH CARE EDUCATION/TRAINING PROGRAM

## 2021-06-18 PROCEDURE — 99232 SBSQ HOSP IP/OBS MODERATE 35: CPT | Performed by: HOSPITALIST

## 2021-06-18 PROCEDURE — 82728 ASSAY OF FERRITIN: CPT

## 2021-06-18 PROCEDURE — 700111 HCHG RX REV CODE 636 W/ 250 OVERRIDE (IP): Performed by: HOSPITALIST

## 2021-06-18 PROCEDURE — 82607 VITAMIN B-12: CPT

## 2021-06-18 PROCEDURE — 85046 RETICYTE/HGB CONCENTRATE: CPT

## 2021-06-18 PROCEDURE — A9270 NON-COVERED ITEM OR SERVICE: HCPCS | Performed by: HOSPITALIST

## 2021-06-18 PROCEDURE — 85027 COMPLETE CBC AUTOMATED: CPT

## 2021-06-18 PROCEDURE — 36415 COLL VENOUS BLD VENIPUNCTURE: CPT

## 2021-06-18 PROCEDURE — 700102 HCHG RX REV CODE 250 W/ 637 OVERRIDE(OP): Performed by: STUDENT IN AN ORGANIZED HEALTH CARE EDUCATION/TRAINING PROGRAM

## 2021-06-18 RX ADMIN — ENOXAPARIN SODIUM 40 MG: 40 INJECTION SUBCUTANEOUS at 06:00

## 2021-06-18 RX ADMIN — METOPROLOL TARTRATE 50 MG: 50 TABLET, FILM COATED ORAL at 17:12

## 2021-06-18 RX ADMIN — Medication 1 TABLET: at 06:23

## 2021-06-18 RX ADMIN — ACETAMINOPHEN 650 MG: 325 TABLET, FILM COATED ORAL at 20:17

## 2021-06-18 RX ADMIN — LEVOTHYROXINE SODIUM 88 MCG: 0.09 TABLET ORAL at 06:23

## 2021-06-18 RX ADMIN — DOXYCYCLINE 100 MG: 100 TABLET, FILM COATED ORAL at 18:00

## 2021-06-18 RX ADMIN — DOXYCYCLINE 100 MG: 100 TABLET, FILM COATED ORAL at 06:01

## 2021-06-18 RX ADMIN — CETIRIZINE HYDROCHLORIDE 10 MG: 10 TABLET, FILM COATED ORAL at 06:01

## 2021-06-18 RX ADMIN — PREDNISONE 80 MG: 20 TABLET ORAL at 06:00

## 2021-06-18 ASSESSMENT — ENCOUNTER SYMPTOMS
ORTHOPNEA: 0
HALLUCINATIONS: 0
ABDOMINAL PAIN: 1
HEADACHES: 0
SHORTNESS OF BREATH: 1
HEMOPTYSIS: 0
PND: 0
CONSTIPATION: 0
TINGLING: 0
NECK PAIN: 0
PALPITATIONS: 0
CHILLS: 1
DIARRHEA: 1
WHEEZING: 0
DOUBLE VISION: 0
PHOTOPHOBIA: 0
WEAKNESS: 0
WEIGHT LOSS: 1
STRIDOR: 0
BLURRED VISION: 0
FLANK PAIN: 0
VOMITING: 0
MYALGIAS: 0
POLYDIPSIA: 0
SPUTUM PRODUCTION: 0
DIZZINESS: 0
CLAUDICATION: 0
HEARTBURN: 0
SINUS PAIN: 0
TREMORS: 0
FEVER: 1
COUGH: 0
FALLS: 0
BACK PAIN: 0
DIAPHORESIS: 1
BLOOD IN STOOL: 0
EYE PAIN: 0
BRUISES/BLEEDS EASILY: 0
SORE THROAT: 0
DEPRESSION: 0
NAUSEA: 1

## 2021-06-18 ASSESSMENT — PAIN DESCRIPTION - PAIN TYPE
TYPE: ACUTE PAIN
TYPE: ACUTE PAIN

## 2021-06-18 ASSESSMENT — FIBROSIS 4 INDEX: FIB4 SCORE: 1.06

## 2021-06-18 ASSESSMENT — LIFESTYLE VARIABLES: SUBSTANCE_ABUSE: 0

## 2021-06-18 NOTE — CARE PLAN
The patient is Stable - Low risk of patient condition declining or worsening    Shift Goals  Clinical Goals: Safety, maintain adequate oxygenation, ventilation, perfusion  Patient Goals: Comfort, sleep  Family Goals: NA    Progress made toward(s) clinical / shift goals:        Problem: Knowledge Deficit - Standard  Goal: Patient and family/care givers will demonstrate understanding of plan of care, disease process/condition, diagnostic tests and medications  Outcome: Progressing     Problem: Hemodynamics  Goal: Patient's hemodynamics, fluid balance and neurologic status will be stable or improve  Outcome: Progressing     Problem: Fluid Volume  Goal: Fluid volume balance will be maintained  Outcome: Progressing       Patient is not progressing towards the following goals:

## 2021-06-18 NOTE — PROGRESS NOTES
Received bedside report from ALYSON madrigal, pt care assumed, VS stable, pt assessment complete. Pt AAOx4, c/o no pain at this time. No signs of acute distress noted at this time. POC discussed with pt and verbalizes no questions. Pt denies any additional needs at this time. Bed in lowest position, bed alarm off. Pt educated on fall risk and verbalized understanding, call light within reach, hourly rounding initiated. in a sinus rhythm.

## 2021-06-18 NOTE — PROGRESS NOTES
ID brief note:  Skin biopsy reviewed-c/w drug reaction  Viral exanthem and urticaria also in differential but do not clinical history  Mult lab abnormalities noted: elevated Factin and alpha-1-antitrysin, decreased C3 and C4  Will sign off  Please reconsult if needed

## 2021-06-18 NOTE — PROGRESS NOTES
Hospital Medicine Daily Progress Note    Date of Service  6/18/2021    Chief Complaint  45 y.o. female admitted 6/15/2021 with history of hypertension, hyperlipidemia, diabetes who presented 6/15/2021 with constellation of symptoms of fever, maculopapular rash, cough, shortness of breath, abnormal labs.  Patient states that her symptoms started on 6/6 and she returned from [place] in May.  In [place] she was bitten by 3 bugs in her left arm.  Patient had a extensive work-up for Lyme disease, [place] spotted fever.  3 weeks prior to her illness she was started on allopurinol.  1 week prior she was started on ARB for blood pressure.    Hospital Course  In the ED she was started on Unasyn, vancomycin, Zofran, morphine and sepsis bolus.  CT scan of the abdomen found ill-defined low-attenuation lesions in the spleen, hepatic steatosis, bilateral axillary lymph nodes.    Interval Problem Update  6/16: Patient continues to feel fatigued and states that the rash is not improving.  She but has persistent lactic acidosis, eosinophilia, Elevated Procalcitonin, leukocytosis and elevated liver enzymes.  Infectious disease was consulted and required a leptospirosis antigen.  HIV and hepatitis are negative.  In addition I will order ANCA, JOSE.  I performed this skin punch biopsy today and await for results.  6/17: Significant improvement in patient's rash and symptoms today after steroids started.  Skin biopsy was also still pending.  Various lab work including leptospirosis is still pending. ID recommended for her to complete her course of doxycyline.   6/18: Patient's pathology report found evidence of drug reaction versus viral exanthem.  eosinophils were found on pathology report.  This is likely consistent with dress syndrome.  We will continue to treat with steroids and have her follow-up with dermatology as an outpatient.  Patient was found to be anemic, will work-up for this including iron panel, B12, folate and  hemoglobin electrophoresis.   Consultants/Specialty  Infectious disease    Code Status  Full Code    Disposition  TBD    Review of Systems  Review of Systems   Constitutional: Positive for chills, diaphoresis, fever, malaise/fatigue and weight loss.   HENT: Negative for congestion, ear discharge, ear pain, hearing loss, nosebleeds, sinus pain, sore throat and tinnitus.    Eyes: Negative for blurred vision, double vision, photophobia and pain.   Respiratory: Positive for shortness of breath. Negative for cough, hemoptysis, sputum production, wheezing and stridor.    Cardiovascular: Negative for chest pain, palpitations, orthopnea, claudication, leg swelling and PND.   Gastrointestinal: Positive for abdominal pain, diarrhea and nausea. Negative for blood in stool, constipation, heartburn, melena and vomiting.   Genitourinary: Negative for dysuria, flank pain, frequency, hematuria and urgency.   Musculoskeletal: Negative for back pain, falls, joint pain, myalgias and neck pain.   Skin: Positive for rash. Negative for itching.   Neurological: Negative for dizziness, tingling, tremors, weakness and headaches.   Endo/Heme/Allergies: Negative for environmental allergies and polydipsia. Does not bruise/bleed easily.   Psychiatric/Behavioral: Negative for depression, hallucinations, substance abuse and suicidal ideas.        Physical Exam  Temp:  [35.9 °C (96.7 °F)-36.5 °C (97.7 °F)] 35.9 °C (96.7 °F)  Pulse:  [80-98] 80  Resp:  [16-18] 18  BP: (101-114)/(63-74) 114/74  SpO2:  [92 %-99 %] 95 %    Physical Exam  Vitals and nursing note reviewed.   Constitutional:       General: She is not in acute distress.     Appearance: Normal appearance. She is not ill-appearing, toxic-appearing or diaphoretic.   HENT:      Head: Normocephalic and atraumatic.      Nose: No congestion or rhinorrhea.      Mouth/Throat:      Pharynx: No oropharyngeal exudate or posterior oropharyngeal erythema.      Comments: Petechial on the hard  palate  Eyes:      General: No scleral icterus.  Neck:      Vascular: No carotid bruit.   Cardiovascular:      Rate and Rhythm: Normal rate and regular rhythm.      Pulses: Normal pulses.      Heart sounds: Normal heart sounds. No murmur heard.   No friction rub. No gallop.    Pulmonary:      Effort: Pulmonary effort is normal. No respiratory distress.      Breath sounds: Normal breath sounds. No stridor. No wheezing or rhonchi.   Abdominal:      General: Abdomen is flat. There is no distension.      Palpations: There is no mass.      Tenderness: There is no abdominal tenderness. There is no left CVA tenderness, guarding or rebound.      Hernia: No hernia is present.   Musculoskeletal:         General: No swelling. Normal range of motion.      Cervical back: No rigidity. No muscular tenderness.      Right lower leg: No edema.      Left lower leg: No edema.   Lymphadenopathy:      Cervical: Cervical adenopathy present.   Skin:     General: Skin is warm and dry.      Capillary Refill: Capillary refill takes more than 3 seconds.      Coloration: Skin is not jaundiced or pale.      Findings: No bruising or erythema.      Comments: Maculopapular rash throughout all 4 extremities and trunk   Neurological:      Mental Status: She is alert.         Fluids    Intake/Output Summary (Last 24 hours) at 6/18/2021 1417  Last data filed at 6/17/2021 2000  Gross per 24 hour   Intake 600 ml   Output 100 ml   Net 500 ml       Laboratory  Recent Labs     06/16/21  0345 06/17/21 0229 06/18/21  0203   WBC 28.9* 19.6* 18.1*   RBC 3.25* 2.83* 2.65*   HEMOGLOBIN 10.0* 8.6* 8.0*   HEMATOCRIT 30.0* 26.5* 25.1*   MCV 92.3 93.6 94.7   MCH 30.8 30.4 30.2   MCHC 33.3* 32.5* 31.9*   RDW 49.8 51.2* 51.4*   PLATELETCT 301 212 267   MPV 10.6 12.4 10.7     Recent Labs     06/16/21  0345 06/17/21 0229 06/18/21  0203   SODIUM 129* 128* 130*   POTASSIUM 4.3 4.1 3.9   CHLORIDE 95* 97 99   CO2 20 22 24   GLUCOSE 82 109* 114*   BUN 11 12 13   CREATININE  0.92 0.87 0.72   CALCIUM 7.6* 7.5* 7.5*     Recent Labs     06/16/21  0345   INR 1.46*               Imaging  EC-ECHOCARDIOGRAM COMPLETE W/O CONT   Final Result      CT-SOFT TISSUE NECK WITH   Final Result      1.  No abscess identified.      2.  Nonspecific increase in the number of cervical lymph nodes without pathologic enlargement.      CT-ABDOMEN-PELVIS WITH   Final Result         1. Ill-defined low-attenuation lesions in the spleen is indeterminate.      2. Status post cholecystectomy. No dilatation of the CBD      3. Hepatic steatosis.      4. Partially visualized mildly prominent bilateral axillary lymph nodes.      DX-CHEST-LIMITED (1 VIEW)   Final Result         1.  No acute cardiopulmonary disease.           Assessment/Plan  * DRESS syndrome  Assessment & Plan  Desquamating Maculopapular rash after drug exposure  Dress vs TEN  Without thrombocytopenia   Eosinophilia   Start oral prednisone will need 8 to 12 weeks   Skin biopsy is pending   Leptospirosis antigen, ANCA, JOSE, cryoglobulin pending   Discontinue antibiotics   Follow up with dermatology as a outpatient     Acute liver failure without hepatic coma  Assessment & Plan  Secondary to dress   Continue to trend Liver enzymes, ammonia   Avoid hepatotoxic medications     Hypothyroidism  Assessment & Plan  Synthroid    HLD (hyperlipidemia)  Assessment & Plan  Resume statin when LFT improved    HTN (hypertension)  Assessment & Plan  Continue home regimen    Diabetes (HCC)  Assessment & Plan  ISS    Swollen neck  Assessment & Plan  Found lymphadenopathy due to dress syndrome    Cellulitis  Assessment & Plan  Antibiotics were discontinued by ID  Continue doxycyline   Monitor for secondary infection  Wound consult in place    UTI (urinary tract infection)  Assessment & Plan  Not symptomatic     Dehydration  Assessment & Plan  IVF    Hyponatremia  Assessment & Plan  Hypovolemic hyponatremia  IV fluid hydration  Daily correction by more than 4 to 6 mEq/L  should be avoided to minimize the risk of central demyelinating lesions with neurologic dysfunction       Anemia  Assessment & Plan  Patient found to have decreasing hemoglobin and a work-up for this including iron panel, B12, folate and Hb electrophoresis since she has target cells on her CBC    LFT elevation  Assessment & Plan  Secondary to dress vs TEN    Lactic acidosis  Assessment & Plan  Secondary to sepsis    Severe sepsis (HCC)  Assessment & Plan  This is Sepsis Present on admission  SIRS criteria identified on my evaluation include: Fever, with temperature greater than 101 deg F, Tachycardia, with heart rate greater than 90 BPM, Tachypnea, with respirations greater than 20 per minute, Leukocytosis, with WBC greater than 12,000 and Bandemia, greater than 10% bands  LA 4.2>>5.6, WBC 27.4k    Source is Dress vs TEN  Sepsis protocol initiated  Fluid resuscitation ordered per protocol  IV antibiotics as appropriate for source of sepsis  While organ dysfunction may be noted elsewhere in this problem list or in the chart, degree of organ dysfunction does not meet CMS criteria for severe sepsis             VTE prophylaxis: lovenox

## 2021-06-18 NOTE — PROGRESS NOTES
Bedside report received and patient care assumed. Pt is resting in bed, A&Ox4, with no complaints of pain, and is on 1L via NC. Tele box on. All fall precautions are in place, belongings at bedside table.  Pt was updated on POC, no questions or concerns. Pt educated on use of call light for assistance.

## 2021-06-18 NOTE — CARE PLAN
The patient is Stable - Low risk of patient condition declining or worsening    Shift Goals  Clinical Goals: Safety, wean off oxygen, improve skin integrity   Patient Goals: Sleep, lotion to face, decrease the dryness of skin, go home  Family Goals: NA    Progress made toward(s) clinical / shift goals:    Problem: Hemodynamics  Goal: Patient's hemodynamics, fluid balance and neurologic status will be stable or improve  Outcome: Progressing     Problem: Fluid Volume  Goal: Fluid volume balance will be maintained  Outcome: Progressing     Problem: Respiratory  Goal: Patient will achieve/maintain optimum respiratory ventilation and gas exchange  Outcome: Progressing       Patient is not progressing towards the following goals:NA

## 2021-06-19 VITALS
HEART RATE: 71 BPM | SYSTOLIC BLOOD PRESSURE: 114 MMHG | HEIGHT: 60 IN | WEIGHT: 189.38 LBS | OXYGEN SATURATION: 95 % | RESPIRATION RATE: 18 BRPM | DIASTOLIC BLOOD PRESSURE: 71 MMHG | TEMPERATURE: 97.4 F | BODY MASS INDEX: 37.18 KG/M2

## 2021-06-19 LAB
ALBUMIN SERPL BCP-MCNC: 2.4 G/DL (ref 3.2–4.9)
ALBUMIN/GLOB SERPL: 0.6 G/DL
ALP SERPL-CCNC: 550 U/L (ref 30–99)
ALT SERPL-CCNC: 221 U/L (ref 2–50)
ANION GAP SERPL CALC-SCNC: 8 MMOL/L (ref 7–16)
ANISOCYTOSIS BLD QL SMEAR: ABNORMAL
AST SERPL-CCNC: 105 U/L (ref 12–45)
BASOPHILS # BLD AUTO: 0.9 % (ref 0–1.8)
BASOPHILS # BLD: 0.14 K/UL (ref 0–0.12)
BILIRUB SERPL-MCNC: 3 MG/DL (ref 0.1–1.5)
BUN SERPL-MCNC: 15 MG/DL (ref 8–22)
CALCIUM SERPL-MCNC: 7.4 MG/DL (ref 8.5–10.5)
CHLORIDE SERPL-SCNC: 99 MMOL/L (ref 96–112)
CO2 SERPL-SCNC: 25 MMOL/L (ref 20–33)
CREAT SERPL-MCNC: 0.79 MG/DL (ref 0.5–1.4)
EOSINOPHIL # BLD AUTO: 0.14 K/UL (ref 0–0.51)
EOSINOPHIL NFR BLD: 0.9 % (ref 0–6.9)
ERYTHROCYTE [DISTWIDTH] IN BLOOD BY AUTOMATED COUNT: 52.8 FL (ref 35.9–50)
GLOBULIN SER CALC-MCNC: 4.1 G/DL (ref 1.9–3.5)
GLUCOSE BLD-MCNC: 101 MG/DL (ref 65–99)
GLUCOSE BLD-MCNC: 127 MG/DL (ref 65–99)
GLUCOSE SERPL-MCNC: 88 MG/DL (ref 65–99)
HCT VFR BLD AUTO: 26.4 % (ref 37–47)
HGB BLD-MCNC: 8.5 G/DL (ref 12–16)
LYMPHOCYTES # BLD AUTO: 0.43 K/UL (ref 1–4.8)
LYMPHOCYTES NFR BLD: 2.7 % (ref 22–41)
MACROCYTES BLD QL SMEAR: ABNORMAL
MANUAL DIFF BLD: NORMAL
MCH RBC QN AUTO: 30.6 PG (ref 27–33)
MCHC RBC AUTO-ENTMCNC: 32.2 G/DL (ref 33.6–35)
MCV RBC AUTO: 95 FL (ref 81.4–97.8)
METAMYELOCYTES NFR BLD MANUAL: 1.8 %
MONOCYTES # BLD AUTO: 0.55 K/UL (ref 0–0.85)
MONOCYTES NFR BLD AUTO: 3.5 % (ref 0–13.4)
MORPHOLOGY BLD-IMP: NORMAL
MYELOCYTES NFR BLD MANUAL: 3.6 %
NEUTROPHILS # BLD AUTO: 13.68 K/UL (ref 2–7.15)
NEUTROPHILS NFR BLD: 83.9 % (ref 44–72)
NEUTS BAND NFR BLD MANUAL: 2.7 % (ref 0–10)
NRBC # BLD AUTO: 0.5 K/UL
NRBC BLD-RTO: 3.2 /100 WBC
PLATELET # BLD AUTO: 346 K/UL (ref 164–446)
PLATELET BLD QL SMEAR: NORMAL
PMV BLD AUTO: 10.2 FL (ref 9–12.9)
POLYCHROMASIA BLD QL SMEAR: NORMAL
POTASSIUM SERPL-SCNC: 3.9 MMOL/L (ref 3.6–5.5)
PROT SERPL-MCNC: 6.5 G/DL (ref 6–8.2)
RBC # BLD AUTO: 2.78 M/UL (ref 4.2–5.4)
RBC BLD AUTO: PRESENT
SODIUM SERPL-SCNC: 132 MMOL/L (ref 135–145)
WBC # BLD AUTO: 15.8 K/UL (ref 4.8–10.8)

## 2021-06-19 PROCEDURE — 36415 COLL VENOUS BLD VENIPUNCTURE: CPT

## 2021-06-19 PROCEDURE — 85007 BL SMEAR W/DIFF WBC COUNT: CPT

## 2021-06-19 PROCEDURE — A9270 NON-COVERED ITEM OR SERVICE: HCPCS | Performed by: INTERNAL MEDICINE

## 2021-06-19 PROCEDURE — 82962 GLUCOSE BLOOD TEST: CPT

## 2021-06-19 PROCEDURE — 80053 COMPREHEN METABOLIC PANEL: CPT

## 2021-06-19 PROCEDURE — 700111 HCHG RX REV CODE 636 W/ 250 OVERRIDE (IP): Performed by: STUDENT IN AN ORGANIZED HEALTH CARE EDUCATION/TRAINING PROGRAM

## 2021-06-19 PROCEDURE — 99239 HOSP IP/OBS DSCHRG MGMT >30: CPT | Performed by: HOSPITALIST

## 2021-06-19 PROCEDURE — 700102 HCHG RX REV CODE 250 W/ 637 OVERRIDE(OP): Performed by: STUDENT IN AN ORGANIZED HEALTH CARE EDUCATION/TRAINING PROGRAM

## 2021-06-19 PROCEDURE — A9270 NON-COVERED ITEM OR SERVICE: HCPCS | Performed by: STUDENT IN AN ORGANIZED HEALTH CARE EDUCATION/TRAINING PROGRAM

## 2021-06-19 PROCEDURE — 85027 COMPLETE CBC AUTOMATED: CPT

## 2021-06-19 PROCEDURE — 700111 HCHG RX REV CODE 636 W/ 250 OVERRIDE (IP): Performed by: HOSPITALIST

## 2021-06-19 PROCEDURE — 700102 HCHG RX REV CODE 250 W/ 637 OVERRIDE(OP): Performed by: INTERNAL MEDICINE

## 2021-06-19 RX ORDER — DOXYCYCLINE 100 MG/1
100 TABLET ORAL EVERY 12 HOURS
Qty: 2 TABLET | Refills: 0 | Status: SHIPPED | OUTPATIENT
Start: 2021-06-19 | End: 2021-06-20

## 2021-06-19 RX ORDER — PREDNISONE 20 MG/1
80 TABLET ORAL DAILY
Qty: 120 TABLET | Refills: 0 | Status: SHIPPED | OUTPATIENT
Start: 2021-06-19 | End: 2021-07-19

## 2021-06-19 RX ADMIN — ENOXAPARIN SODIUM 40 MG: 40 INJECTION SUBCUTANEOUS at 05:57

## 2021-06-19 RX ADMIN — DOXYCYCLINE 100 MG: 100 TABLET, FILM COATED ORAL at 05:57

## 2021-06-19 RX ADMIN — PREDNISONE 80 MG: 20 TABLET ORAL at 05:56

## 2021-06-19 RX ADMIN — Medication 1 TABLET: at 05:56

## 2021-06-19 RX ADMIN — CETIRIZINE HYDROCHLORIDE 10 MG: 10 TABLET, FILM COATED ORAL at 05:56

## 2021-06-19 RX ADMIN — METOPROLOL TARTRATE 50 MG: 50 TABLET, FILM COATED ORAL at 05:57

## 2021-06-19 ASSESSMENT — PAIN DESCRIPTION - PAIN TYPE: TYPE: ACUTE PAIN

## 2021-06-19 NOTE — CARE PLAN
Problem: Knowledge Deficit - Standard  Goal: Patient and family/care givers will demonstrate understanding of plan of care, disease process/condition, diagnostic tests and medications  Outcome: Progressing     Problem: Pain - Standard  Goal: Alleviation of pain or a reduction in pain to the patient’s comfort goal  Outcome: Progressing     Educated pt on pain mgmt options including medication, and lotion.

## 2021-06-19 NOTE — PROGRESS NOTES
Bedside report received from day nurse. Assumed care of patient. Pt. resting comfortably without any sign of distress. A&Ox4, VSS, no complaints at this time. POC reviewed and white board updated, Call light in reach, Bed locked in lowest position.

## 2021-06-19 NOTE — DISCHARGE INSTRUCTIONS
Discharge Instructions    Discharged to home by car with relative. Discharged via wheelchair, hospital escort: Yes.  Special equipment needed: Not Applicable    Be sure to schedule a follow-up appointment with your primary care doctor or any specialists as instructed.     Discharge Plan:   Diet Plan: Discussed  Activity Level: Discussed  Confirmed Follow up Appointment: Patient to Call and Schedule Appointment  Confirmed Symptoms Management: Discussed  Medication Reconciliation Updated: Yes    I understand that a diet low in cholesterol, fat, and sodium is recommended for good health. Unless I have been given specific instructions below for another diet, I accept this instruction as my diet prescription.   Other diet: Heart healthy    Special Instructions: None    · Is patient discharged on Warfarin / Coumadin?   No     Prednisone tablets  What is this medicine?  PREDNISONE (PRED ni sone) is a corticosteroid. It is commonly used to treat inflammation of the skin, joints, lungs, and other organs. Common conditions treated include asthma, allergies, and arthritis. It is also used for other conditions, such as blood disorders and diseases of the adrenal glands.  This medicine may be used for other purposes; ask your health care provider or pharmacist if you have questions.  COMMON BRAND NAME(S): Deltasone, Predone, Sterapred, Sterapred DS  What should I tell my health care provider before I take this medicine?  They need to know if you have any of these conditions:  · Cushing's syndrome  · diabetes  · glaucoma  · heart disease  · high blood pressure  · infection (especially a virus infection such as chickenpox, cold sores, or herpes)  · kidney disease  · liver disease  · mental illness  · myasthenia gravis  · osteoporosis  · seizures  · stomach or intestine problems  · thyroid disease  · an unusual or allergic reaction to lactose, prednisone, other medicines, foods, dyes, or preservatives  · pregnant or trying to get  pregnant  · breast-feeding  How should I use this medicine?  Take this medicine by mouth with a glass of water. Follow the directions on the prescription label. Take this medicine with food. If you are taking this medicine once a day, take it in the morning. Do not take more medicine than you are told to take. Do not suddenly stop taking your medicine because you may develop a severe reaction. Your doctor will tell you how much medicine to take. If your doctor wants you to stop the medicine, the dose may be slowly lowered over time to avoid any side effects.  Talk to your pediatrician regarding the use of this medicine in children. Special care may be needed.  Overdosage: If you think you have taken too much of this medicine contact a poison control center or emergency room at once.  NOTE: This medicine is only for you. Do not share this medicine with others.  What if I miss a dose?  If you miss a dose, take it as soon as you can. If it is almost time for your next dose, talk to your doctor or health care professional. You may need to miss a dose or take an extra dose. Do not take double or extra doses without advice.  What may interact with this medicine?  Do not take this medicine with any of the following medications:  · metyrapone  · mifepristone  This medicine may also interact with the following medications:  · aminoglutethimide  · amphotericin B  · aspirin and aspirin-like medicines  · barbiturates  · certain medicines for diabetes, like glipizide or glyburide  · cholestyramine  · cholinesterase inhibitors  · cyclosporine  · digoxin  · diuretics  · ephedrine  · female hormones, like estrogens and birth control pills  · isoniazid  · ketoconazole  · NSAIDS, medicines for pain and inflammation, like ibuprofen or naproxen  · phenytoin  · rifampin  · toxoids  · vaccines  · warfarin  This list may not describe all possible interactions. Give your health care provider a list of all the medicines, herbs,  non-prescription drugs, or dietary supplements you use. Also tell them if you smoke, drink alcohol, or use illegal drugs. Some items may interact with your medicine.  What should I watch for while using this medicine?  Visit your doctor or health care professional for regular checks on your progress. If you are taking this medicine over a prolonged period, carry an identification card with your name and address, the type and dose of your medicine, and your doctor's name and address.  This medicine may increase your risk of getting an infection. Tell your doctor or health care professional if you are around anyone with measles or chickenpox, or if you develop sores or blisters that do not heal properly.  If you are going to have surgery, tell your doctor or health care professional that you have taken this medicine within the last twelve months.  Ask your doctor or health care professional about your diet. You may need to lower the amount of salt you eat.  This medicine may increase blood sugar. Ask your healthcare provider if changes in diet or medicines are needed if you have diabetes.  What side effects may I notice from receiving this medicine?  Side effects that you should report to your doctor or health care professional as soon as possible:  · allergic reactions like skin rash, itching or hives, swelling of the face, lips, or tongue  · changes in emotions or moods  · changes in vision  · depressed mood  · eye pain  · fever or chills, cough, sore throat, pain or difficulty passing urine  · signs and symptoms of high blood sugar such as being more thirsty or hungry or having to urinate more than normal. You may also feel very tired or have blurry vision.  · swelling of ankles, feet  Side effects that usually do not require medical attention (report to your doctor or health care professional if they continue or are bothersome):  · confusion, excitement, restlessness  · headache  · nausea, vomiting  · skin  problems, acne, thin and shiny skin  · trouble sleeping  · weight gain  This list may not describe all possible side effects. Call your doctor for medical advice about side effects. You may report side effects to FDA at 4-525-UKQ-1883.  Where should I keep my medicine?  Keep out of the reach of children.  Store at room temperature between 15 and 30 degrees C (59 and 86 degrees F). Protect from light. Keep container tightly closed. Throw away any unused medicine after the expiration date.  NOTE: This sheet is a summary. It may not cover all possible information. If you have questions about this medicine, talk to your doctor, pharmacist, or health care provider.  © 2020 [employer] (2019-09-17 10:54:22)      Doxycycline tablets or capsules  What is this medicine?  DOXYCYCLINE (dox lindsay GLORIA jimenez) is a tetracycline antibiotic. It kills certain bacteria or stops their growth. It is used to treat many kinds of infections, like dental, skin, respiratory, and urinary tract infections. It also treats acne, Lyme disease, malaria, and certain sexually transmitted infections.  This medicine may be used for other purposes; ask your health care provider or pharmacist if you have questions.  COMMON BRAND NAME(S): Acticlate, Adoxa, Adoxa CK, Adoxa Juan, Adoxa TT, Alodox, Avidoxy, Doxal, LYMEPAK, Mondoxyne NL, Monodox, Morgidox 1x, Morgidox 1x Kit, Morgidox 2x, Morgidox 2x Kit, NutriDox, Ocudox, TARGADOX, Vibra-Tabs, Vibramycin  What should I tell my health care provider before I take this medicine?  They need to know if you have any of these conditions:  · liver disease  · long exposure to sunlight like working outdoors  · stomach problems like colitis  · an unusual or allergic reaction to doxycycline, tetracycline antibiotics, other medicines, foods, dyes, or preservatives  · pregnant or trying to get pregnant  · breast-feeding  How should I use this medicine?  Take this medicine by mouth with a full glass of water. Follow  the directions on the prescription label. It is best to take this medicine without food, but if it upsets your stomach take it with food. Take your medicine at regular intervals. Do not take your medicine more often than directed. Take all of your medicine as directed even if you think you are better. Do not skip doses or stop your medicine early.  Talk to your pediatrician regarding the use of this medicine in children. While this drug may be prescribed for selected conditions, precautions do apply.  Overdosage: If you think you have taken too much of this medicine contact a poison control center or emergency room at once.  NOTE: This medicine is only for you. Do not share this medicine with others.  What if I miss a dose?  If you miss a dose, take it as soon as you can. If it is almost time for your next dose, take only that dose. Do not take double or extra doses.  What may interact with this medicine?  · antacids  · barbiturates  · birth control pills  · bismuth subsalicylate  · carbamazepine  · methoxyflurane  · other antibiotics  · phenytoin  · vitamins that contain iron  · warfarin  This list may not describe all possible interactions. Give your health care provider a list of all the medicines, herbs, non-prescription drugs, or dietary supplements you use. Also tell them if you smoke, drink alcohol, or use illegal drugs. Some items may interact with your medicine.  What should I watch for while using this medicine?  Tell your doctor or health care professional if your symptoms do not improve.  Do not treat diarrhea with over the counter products. Contact your doctor if you have diarrhea that lasts more than 2 days or if it is severe and watery.  Do not take this medicine just before going to bed. It may not dissolve properly when you lay down and can cause pain in your throat. Drink plenty of fluids while taking this medicine to also help reduce irritation in your throat.  This medicine can make you more  sensitive to the sun. Keep out of the sun. If you cannot avoid being in the sun, wear protective clothing and use sunscreen. Do not use sun lamps or tanning beds/booths.  Birth control pills may not work properly while you are taking this medicine. Talk to your doctor about using an extra method of birth control.  If you are being treated for a sexually transmitted infection, avoid sexual contact until you have finished your treatment. Your sexual partner may also need treatment.  Avoid antacids, aluminum, calcium, magnesium, and iron products for 4 hours before and 2 hours after taking a dose of this medicine.  If you are using this medicine to prevent malaria, you should still protect yourself from contact with mosquitos. Stay in screened-in areas, use mosquito nets, keep your body covered, and use an insect repellent.  What side effects may I notice from receiving this medicine?  Side effects that you should report to your doctor or health care professional as soon as possible:  · allergic reactions like skin rash, itching or hives, swelling of the face, lips, or tongue  · difficulty breathing  · fever  · itching in the rectal or genital area  · pain on swallowing  · rash, fever, and swollen lymph nodes  · redness, blistering, peeling or loosening of the skin, including inside the mouth  · severe stomach pain or cramps  · unusual bleeding or bruising  · unusually weak or tired  · yellowing of the eyes or skin  Side effects that usually do not require medical attention (report to your doctor or health care professional if they continue or are bothersome):  · diarrhea  · loss of appetite  · nausea, vomiting  This list may not describe all possible side effects. Call your doctor for medical advice about side effects. You may report side effects to FDA at 4-166-FDA-7323.  Where should I keep my medicine?  Keep out of the reach of children.  Store at room temperature, below 30 degrees C (86 degrees F). Protect from  light. Keep container tightly closed. Throw away any unused medicine after the expiration date. Taking this medicine after the expiration date can make you seriously ill.  NOTE: This sheet is a summary. It may not cover all possible information. If you have questions about this medicine, talk to your doctor, pharmacist, or health care provider.  © 2020 [employer] Standard (2020-03-19 13:44:53)        Rash, Adult    A rash is a change in the color of your skin. A rash can also change the way your skin feels. There are many different conditions and factors that can cause a rash.  Follow these instructions at home:  The goal of treatment is to stop the itching and keep the rash from spreading. Watch for any changes in your symptoms. Let your doctor know about them. Follow these instructions to help with your condition:  Medicine  Take or apply over-the-counter and prescription medicines only as told by your doctor. These may include medicines:  · To treat red or swollen skin (corticosteroid creams).  · To treat itching.  · To treat an allergy (oral antihistamines).  · To treat very bad symptoms (oral corticosteroids).    Skin care  · Put cool cloths (compresses) on the affected areas.  · Do not scratch or rub your skin.  · Avoid covering the rash. Make sure that the rash is exposed to air as much as possible.  Managing itching and discomfort  · Avoid hot showers or baths. These can make itching worse. A cold shower may help.  · Try taking a bath with:  ? Epsom salts. You can get these at your local pharmacy or grocery store. Follow the instructions on the package.  ? Baking soda. Pour a small amount into the bath as told by your doctor.  ? Colloidal oatmeal. You can get this at your local pharmacy or grocery store. Follow the instructions on the package.  · Try putting baking soda paste onto your skin. Stir water into baking soda until it gets like a paste.  · Try putting on a lotion that relieves itchiness  "(calamine lotion).  · Keep cool and out of the sun. Sweating and being hot can make itching worse.  General instructions    · Rest as needed.  · Drink enough fluid to keep your pee (urine) pale yellow.  · Wear loose-fitting clothing.  · Avoid scented soaps, detergents, and perfumes. Use gentle soaps, detergents, perfumes, and other cosmetic products.  · Avoid anything that causes your rash. Keep a journal to help track what causes your rash. Write down:  ? What you eat.  ? What cosmetic products you use.  ? What you drink.  ? What you wear. This includes jewelry.  · Keep all follow-up visits as told by your doctor. This is important.  Contact a doctor if:  · You sweat at night.  · You lose weight.  · You pee (urinate) more than normal.  · You pee less than normal, or you notice that your pee is a darker color than normal.  · You feel weak.  · You throw up (vomit).  · Your skin or the whites of your eyes look yellow (jaundice).  · Your skin:  ? Tingles.  ? Is numb.  · Your rash:  ? Does not go away after a few days.  ? Gets worse.  · You are:  ? More thirsty than normal.  ? More tired than normal.  · You have:  ? New symptoms.  ? Pain in your belly (abdomen).  ? A fever.  ? Watery poop (diarrhea).  Get help right away if:  · You have a fever and your symptoms suddenly get worse.  · You start to feel mixed up (confused).  · You have a very bad headache or a stiff neck.  · You have very bad joint pains or stiffness.  · You have jerky movements that you cannot control (seizure).  · Your rash covers all or most of your body. The rash may or may not be painful.  · You have blisters that:  ? Are on top of the rash.  ? Grow larger.  ? Grow together.  ? Are painful.  ? Are inside your nose or mouth.  · You have a rash that:  ? Looks like purple pinprick-sized spots all over your body.  ? Has a \"bull's eye\" or looks like a target.  ? Is red and painful, causes your skin to peel, and is not from being in the sun too " long.  Summary  · A rash is a change in the color of your skin. A rash can also change the way your skin feels.  · The goal of treatment is to stop the itching and keep the rash from spreading.  · Take or apply over-the-counter and prescription medicines only as told by your doctor.  · Contact a doctor if you have new symptoms or symptoms that get worse.  · Keep all follow-up visits as told by your doctor. This is important.  This information is not intended to replace advice given to you by your health care provider. Make sure you discuss any questions you have with your health care provider.  Document Released: 06/05/2009 Document Revised: 04/10/2020 Document Reviewed: 07/22/2019  [employer] Patient Education © 2020 [employer]      Depression / Suicide Risk    As you are discharged from this [facility] facility, it is important to learn how to keep safe from harming yourself.    Recognize the warning signs:  · Abrupt changes in personality, positive or negative- including increase in energy   · Giving away possessions  · Change in eating patterns- significant weight changes-  positive or negative  · Change in sleeping patterns- unable to sleep or sleeping all the time   · Unwillingness or inability to communicate  · Depression  · Unusual sadness, discouragement and loneliness  · Talk of wanting to die  · Neglect of personal appearance   · Rebelliousness- reckless behavior  · Withdrawal from people/activities they love  · Confusion- inability to concentrate     If you or a loved one observes any of these behaviors or has concerns about self-harm, here's what you can do:  · Talk about it- your feelings and reasons for harming yourself  · Remove any means that you might use to hurt yourself (examples: pills, rope, extension cords, firearm)  · Get professional help from the community (Mental Health, Substance Abuse, psychological counseling)  · Do not be alone:Call your Safe Contact- someone whom you trust who  will be there for you.  · Call your local CRISIS HOTLINE 130-3997 or 817-168-8496  · Call your local Children's Mobile Crisis Response Team [place] (000) 029-0360 or www.[employer]  · Call the toll free National Suicide Prevention Hotlines   · National Suicide Prevention Lifeline 638-559-CAOI (4578)  · National [facility] Line Network 800-SUICIDE (289-8997)

## 2021-06-19 NOTE — DISCHARGE SUMMARY
"Discharge Summary    CHIEF COMPLAINT ON ADMISSION  Chief Complaint   Patient presents with   • Rash     Pt has had a rash since 6/6 and was seen and told to come back if sxs worsened. Pt was sent home with abx and has been taking them since. Last dose will be tomorrow. Rash has now spread \"to her whole body\".    • Fever     Pt states she has had fevers on and of since. Pt states she had a fever of 100/7 this AM and took tylenol for that.    • Cough     Pt started having a cough last Friday. Pt states the cough is mostly dry.    • Nausea     Pt has had nausea since sxs started.        Reason for Admission  Follow Up; Fever; Rash     Admission Date  6/15/2021    CODE STATUS  Prior    HPI & HOSPITAL COURSE  This is a 45 y.o. female here with history of hypertension, hyperlipidemia, diabetes who presented 6/15/2021 with constellation of symptoms of fever, maculopapular rash, cough, shortness of breath, abnormal labs.  Patient states that her symptoms started on 6/6 and she returned from [place] in May.  In [place] she was bitten by 3 bugs in her left arm.  Patient had a extensive work-up for Lyme disease, [place] spotted fever.  3 weeks prior to her illness she was started on allopurinol.  1 week prior she was started on ARB for blood pressure.  She presents to the hospital with hypotension tachycardia.In the ED she was started on Unasyn, vancomycin, Zofran, morphine and sepsis bolus.  She was found to have elevated liver enzymes, anemia, leukocytosis, elevated procalcitonin, and lactic acid of 5. CT scan of the abdomen found ill-defined low-attenuation lesions in the spleen, hepatic steatosis, bilateral axillary lymph nodes.  Patient was tested for ANCA, JOSE, HIV and hepatitis that were negative.  Mycoplasma, EBV, Borrelia burgdorferi, Legionella, syphilis, stool culture was found to be negative. Complement was found to be low. She was found to have eosinophilia.  Skin pathology found eosinophilic infiltration and " likely drug reaction.  Antibiotics were discontinued.  Infectious disease was consulted and recommended to complete 14 days of doxycycline.  She was diagnosed with dress syndrome and started on steroids which significantly improved her symptoms.  She was prescribed 30 days of additional 80 mg of prednisone.  Patient will need an 8 to 12-week course of steroids with taper.  She has an appointment with dermatology as an outpatient for additional management.      Therefore, she is discharged in good and stable condition to home with close outpatient follow-up.    The patient met 2-midnight criteria for an inpatient stay at the time of discharge.    Discharge Date  6/19/2021    FOLLOW UP ITEMS POST DISCHARGE  Follow-up with PCP and dermatology    DISCHARGE DIAGNOSES  Principal Problem:    DRESS syndrome POA: Unknown  Active Problems:    Severe sepsis (HCC) POA: Unknown    Lactic acidosis POA: Unknown    LFT elevation POA: Unknown    Anemia POA: Unknown    Hyponatremia POA: Unknown    Dehydration POA: Unknown    UTI (urinary tract infection) POA: Unknown    Cellulitis POA: Unknown    Swollen neck POA: Unknown    Diabetes (HCC) POA: Unknown    HTN (hypertension) POA: Unknown    HLD (hyperlipidemia) POA: Unknown    Hypothyroidism POA: Unknown    Acute liver failure without hepatic coma POA: Unknown  Resolved Problems:    Hyperbilirubinemia POA: Unknown    Pneumonia POA: Unknown      FOLLOW UP  No future appointments.      In 1 week  please call and make a PCP Apt    Pcp Pt States None            MEDICATIONS ON DISCHARGE     Medication List      START taking these medications      Instructions   doxycycline monohydrate 100 MG tablet  Commonly known as: ADOXA  Replaces: doxycycline 100 MG capsule   Take 1 tablet by mouth every 12 hours for 1 day.  Dose: 100 mg     predniSONE 20 MG Tabs  Commonly known as: DELTASONE   Take 4 Tablets by mouth every day for 30 days.  Dose: 80 mg        CONTINUE taking these medications       "Instructions   atorvastatin 10 MG Tabs  Commonly known as: LIPITOR   Take 10 mg by mouth every day.  Dose: 10 mg     cetirizine 10 MG Tabs  Commonly known as: ZYRTEC   Take 10 mg by mouth every day.  Dose: 10 mg     levothyroxine 88 MCG Tabs  Commonly known as: SYNTHROID   Take 88 mcg by mouth see administration instructions. Monday-Friday  Dose: 88 mcg     nebivolol 10 MG Tabs  Commonly known as: BYSTOLIC   Take 10 mg by mouth every day.  Dose: 10 mg     SITagliptin 50 MG Tabs  Commonly known as: JANUVIA   Take 50 mg by mouth every day.  Dose: 50 mg     Vitamin B Complex Tabs   Take 1 tablet by mouth every day.  Dose: 1 tablet        STOP taking these medications    doxycycline 100 MG capsule  Commonly known as: MONODOX  Replaced by: doxycycline monohydrate 100 MG tablet            Allergies  Allergies   Allergen Reactions   • Acetazolamide      Tachycardia, dyspnea  Tachycardia, dyspnea  (taking meds for high altitude)     • Allopurinol      \"rash\"       DIET  No orders of the defined types were placed in this encounter.      ACTIVITY  As tolerated.  Weight bearing as tolerated    CONSULTATIONS  ID     PROCEDURES  none    LABORATORY  Lab Results   Component Value Date    SODIUM 132 (L) 06/19/2021    POTASSIUM 3.9 06/19/2021    CHLORIDE 99 06/19/2021    CO2 25 06/19/2021    GLUCOSE 88 06/19/2021    BUN 15 06/19/2021    CREATININE 0.79 06/19/2021        Lab Results   Component Value Date    WBC 15.8 (H) 06/19/2021    HEMOGLOBIN 8.5 (L) 06/19/2021    HEMATOCRIT 26.4 (L) 06/19/2021    PLATELETCT 346 06/19/2021        Total time of the discharge process exceeds 50 minutes.  "

## 2021-06-20 LAB
MYELOPEROXIDASE AB SER-ACNC: 3 AU/ML (ref 0–19)
PROTEINASE3 AB SER-ACNC: 14 AU/ML (ref 0–19)
TEST NAME 95000: NORMAL

## 2021-06-21 LAB
BACTERIA BLD CULT: NORMAL
BACTERIA BLD CULT: NORMAL
HGB A1 MFR BLD: 96.6 % (ref 95–97.9)
HGB A2 MFR BLD: 3 % (ref 2–3.5)
HGB C MFR BLD: 0 % (ref 0–0)
HGB E MFR BLD: 0 % (ref 0–0)
HGB F MFR BLD: 0.4 % (ref 0–2.1)
HGB FRACT BLD ELPH-IMP: NORMAL
HGB OTHER MFR BLD: 0 % (ref 0–0)
HGB S BLD QL SOLY: NORMAL
HGB S MFR BLD: 0 % (ref 0–0)
PATH INTERP BLD-IMP: NORMAL
SIGNIFICANT IND 70042: NORMAL
SIGNIFICANT IND 70042: NORMAL
SITE SITE: NORMAL
SITE SITE: NORMAL
SOURCE SOURCE: NORMAL
SOURCE SOURCE: NORMAL

## 2021-07-06 NOTE — DOCUMENTATION QUERY
"                                                                         [facility]                                                                       Query Response Note      PATIENT:               ASHLEY ZENG  ACCT #:                  [id number]  MRN:                     [id number]  :                      1975  ADMIT DATE:       6/15/2021 8:04 PM  DISCH DATE:        2021 11:16 AM  RESPONDING  PROVIDER #:        [id number]           QUERY TEXT:    Documented on the H&P is \"Sepsis POA with Severe Sepsis on IV Zosyn.\"   PN dated  states\"antibiotics were discontinued and changed to Doxycycline.\"   Discharge summary states \"she was diagnosed with \"Dress Syndrome\".   Severe Sepsis listed as an active problem.    Please clarify the following:    NOTE:  If an appropriate response is not listed below, please respond with a new note.      The patient's Clinical Indicators include:  Clinical Indicators:  fever 100.7  pulse 122  WBC 27.4  lactic acid 4.2    Treatment and monitoring:  IV Zoysn    Risk Factors:  Bug Bite   Pneumonia    [email or web address]  Options provided:   -- Sepsis ruled in, present on admission (please document known or suspected etiology)   -- Sepsis ruled in, developed after admission (please document known or suspected etiology)   -- Sepsis has been ruled out   -- SIRS that is unrelated to any infection   -- SIRS of non-infectious origin with acute organ dysfunction   -- Unable to determine      Query created by: Gila Fernandez on 2021 4:11 AM    RESPONSE TEXT:    Sepsis has been ruled out          Electronically signed by:  DRE SHERMAN MD 2021 8:20 AM              "

## 2021-07-31 LAB — TEST NAME 95000: NORMAL

## 2021-09-11 ENCOUNTER — HOSPITAL ENCOUNTER (INPATIENT)
Facility: MEDICAL CENTER | Age: 46
LOS: 1 days | DRG: 815 | End: 2021-09-12
Attending: EMERGENCY MEDICINE | Admitting: STUDENT IN AN ORGANIZED HEALTH CARE EDUCATION/TRAINING PROGRAM
Payer: COMMERCIAL

## 2021-09-11 DIAGNOSIS — T78.40XA ALLERGIC REACTION, INITIAL ENCOUNTER: ICD-10-CM

## 2021-09-11 PROBLEM — I95.9 HYPOTENSION: Status: ACTIVE | Noted: 2021-09-11

## 2021-09-11 PROBLEM — E87.29 METABOLIC ACIDOSIS, INCREASED ANION GAP (IAG): Status: ACTIVE | Noted: 2021-06-16

## 2021-09-11 PROBLEM — E66.09 CLASS 1 OBESITY DUE TO EXCESS CALORIES IN ADULT: Status: ACTIVE | Noted: 2021-09-11

## 2021-09-11 PROBLEM — R74.01 TRANSAMINITIS: Status: ACTIVE | Noted: 2021-09-11

## 2021-09-11 LAB
ALBUMIN SERPL BCP-MCNC: 3.7 G/DL (ref 3.2–4.9)
ALBUMIN/GLOB SERPL: 1 G/DL
ALP SERPL-CCNC: 66 U/L (ref 30–99)
ALT SERPL-CCNC: 76 U/L (ref 2–50)
ANION GAP SERPL CALC-SCNC: 17 MMOL/L (ref 7–16)
APPEARANCE UR: CLEAR
AST SERPL-CCNC: 90 U/L (ref 12–45)
BASOPHILS # BLD AUTO: 0.2 % (ref 0–1.8)
BASOPHILS # BLD: 0.03 K/UL (ref 0–0.12)
BILIRUB SERPL-MCNC: 0.7 MG/DL (ref 0.1–1.5)
BILIRUB UR QL STRIP.AUTO: NEGATIVE
BUN SERPL-MCNC: 12 MG/DL (ref 8–22)
C3 SERPL-MCNC: 136.5 MG/DL (ref 87–200)
C4 SERPL-MCNC: 29.8 MG/DL (ref 19–52)
CALCIUM SERPL-MCNC: 9.3 MG/DL (ref 8.5–10.5)
CHLORIDE SERPL-SCNC: 100 MMOL/L (ref 96–112)
CO2 SERPL-SCNC: 19 MMOL/L (ref 20–33)
COLOR UR: YELLOW
CREAT SERPL-MCNC: 1.07 MG/DL (ref 0.5–1.4)
EOSINOPHIL # BLD AUTO: 0.19 K/UL (ref 0–0.51)
EOSINOPHIL NFR BLD: 1.5 % (ref 0–6.9)
ERYTHROCYTE [DISTWIDTH] IN BLOOD BY AUTOMATED COUNT: 42.1 FL (ref 35.9–50)
GLOBULIN SER CALC-MCNC: 3.7 G/DL (ref 1.9–3.5)
GLUCOSE SERPL-MCNC: 124 MG/DL (ref 65–99)
GLUCOSE UR STRIP.AUTO-MCNC: NEGATIVE MG/DL
HCT VFR BLD AUTO: 40.7 % (ref 37–47)
HGB BLD-MCNC: 13.9 G/DL (ref 12–16)
IMM GRANULOCYTES # BLD AUTO: 0.07 K/UL (ref 0–0.11)
IMM GRANULOCYTES NFR BLD AUTO: 0.5 % (ref 0–0.9)
INR PPP: 1.05 (ref 0.87–1.13)
KETONES UR STRIP.AUTO-MCNC: 40 MG/DL
LACTATE BLD-SCNC: 3.3 MMOL/L (ref 0.5–2)
LEUKOCYTE ESTERASE UR QL STRIP.AUTO: NEGATIVE
LYMPHOCYTES # BLD AUTO: 2.4 K/UL (ref 1–4.8)
LYMPHOCYTES NFR BLD: 18.4 % (ref 22–41)
MAGNESIUM SERPL-MCNC: 1.9 MG/DL (ref 1.5–2.5)
MCH RBC QN AUTO: 31.5 PG (ref 27–33)
MCHC RBC AUTO-ENTMCNC: 34.2 G/DL (ref 33.6–35)
MCV RBC AUTO: 92.3 FL (ref 81.4–97.8)
MICRO URNS: ABNORMAL
MONOCYTES # BLD AUTO: 0.86 K/UL (ref 0–0.85)
MONOCYTES NFR BLD AUTO: 6.6 % (ref 0–13.4)
NEUTROPHILS # BLD AUTO: 9.46 K/UL (ref 2–7.15)
NEUTROPHILS NFR BLD: 72.8 % (ref 44–72)
NITRITE UR QL STRIP.AUTO: NEGATIVE
NRBC # BLD AUTO: 0 K/UL
NRBC BLD-RTO: 0 /100 WBC
PH UR STRIP.AUTO: 5.5 [PH] (ref 5–8)
PLATELET # BLD AUTO: 480 K/UL (ref 164–446)
PMV BLD AUTO: 9.1 FL (ref 9–12.9)
POTASSIUM SERPL-SCNC: 3.8 MMOL/L (ref 3.6–5.5)
PROCALCITONIN SERPL-MCNC: 0.09 NG/ML
PROT SERPL-MCNC: 7.4 G/DL (ref 6–8.2)
PROT UR QL STRIP: NEGATIVE MG/DL
PROTHROMBIN TIME: 13.4 SEC (ref 12–14.6)
RBC # BLD AUTO: 4.41 M/UL (ref 4.2–5.4)
RBC UR QL AUTO: NEGATIVE
SODIUM SERPL-SCNC: 136 MMOL/L (ref 135–145)
SP GR UR STRIP.AUTO: 1.01
T4 FREE SERPL-MCNC: 2.06 NG/DL (ref 0.93–1.7)
TSH SERPL DL<=0.005 MIU/L-ACNC: 0.02 UIU/ML (ref 0.38–5.33)
UROBILINOGEN UR STRIP.AUTO-MCNC: 0.2 MG/DL
WBC # BLD AUTO: 13 K/UL (ref 4.8–10.8)

## 2021-09-11 PROCEDURE — 84145 PROCALCITONIN (PCT): CPT

## 2021-09-11 PROCEDURE — 83605 ASSAY OF LACTIC ACID: CPT

## 2021-09-11 PROCEDURE — 700105 HCHG RX REV CODE 258: Performed by: STUDENT IN AN ORGANIZED HEALTH CARE EDUCATION/TRAINING PROGRAM

## 2021-09-11 PROCEDURE — 36415 COLL VENOUS BLD VENIPUNCTURE: CPT

## 2021-09-11 PROCEDURE — 99223 1ST HOSP IP/OBS HIGH 75: CPT | Performed by: STUDENT IN AN ORGANIZED HEALTH CARE EDUCATION/TRAINING PROGRAM

## 2021-09-11 PROCEDURE — 770004 HCHG ROOM/CARE - ONCOLOGY PRIVATE *

## 2021-09-11 PROCEDURE — 86038 ANTINUCLEAR ANTIBODIES: CPT

## 2021-09-11 PROCEDURE — 83735 ASSAY OF MAGNESIUM: CPT

## 2021-09-11 PROCEDURE — 85025 COMPLETE CBC W/AUTO DIFF WBC: CPT

## 2021-09-11 PROCEDURE — 84439 ASSAY OF FREE THYROXINE: CPT

## 2021-09-11 PROCEDURE — 96372 THER/PROPH/DIAG INJ SC/IM: CPT

## 2021-09-11 PROCEDURE — 80053 COMPREHEN METABOLIC PANEL: CPT

## 2021-09-11 PROCEDURE — 93005 ELECTROCARDIOGRAM TRACING: CPT | Performed by: STUDENT IN AN ORGANIZED HEALTH CARE EDUCATION/TRAINING PROGRAM

## 2021-09-11 PROCEDURE — 99285 EMERGENCY DEPT VISIT HI MDM: CPT

## 2021-09-11 PROCEDURE — 85610 PROTHROMBIN TIME: CPT

## 2021-09-11 PROCEDURE — 86160 COMPLEMENT ANTIGEN: CPT

## 2021-09-11 PROCEDURE — 87040 BLOOD CULTURE FOR BACTERIA: CPT

## 2021-09-11 PROCEDURE — 700105 HCHG RX REV CODE 258: Performed by: EMERGENCY MEDICINE

## 2021-09-11 PROCEDURE — 700111 HCHG RX REV CODE 636 W/ 250 OVERRIDE (IP): Performed by: STUDENT IN AN ORGANIZED HEALTH CARE EDUCATION/TRAINING PROGRAM

## 2021-09-11 PROCEDURE — 700111 HCHG RX REV CODE 636 W/ 250 OVERRIDE (IP): Performed by: EMERGENCY MEDICINE

## 2021-09-11 PROCEDURE — 96374 THER/PROPH/DIAG INJ IV PUSH: CPT

## 2021-09-11 PROCEDURE — 81003 URINALYSIS AUTO W/O SCOPE: CPT

## 2021-09-11 PROCEDURE — 96375 TX/PRO/DX INJ NEW DRUG ADDON: CPT

## 2021-09-11 PROCEDURE — 84443 ASSAY THYROID STIM HORMONE: CPT

## 2021-09-11 RX ORDER — LEVOTHYROXINE SODIUM 0.05 MG/1
50 TABLET ORAL
Status: DISCONTINUED | OUTPATIENT
Start: 2021-09-13 | End: 2021-09-12 | Stop reason: HOSPADM

## 2021-09-11 RX ORDER — METOPROLOL TARTRATE 50 MG/1
50 TABLET, FILM COATED ORAL 2 TIMES DAILY
Status: DISCONTINUED | OUTPATIENT
Start: 2021-09-11 | End: 2021-09-12 | Stop reason: HOSPADM

## 2021-09-11 RX ORDER — DIPHENHYDRAMINE HYDROCHLORIDE 50 MG/ML
50 INJECTION INTRAMUSCULAR; INTRAVENOUS ONCE
Status: DISCONTINUED | OUTPATIENT
Start: 2021-09-11 | End: 2021-09-12

## 2021-09-11 RX ORDER — HYDROMORPHONE HYDROCHLORIDE 1 MG/ML
0.25 INJECTION, SOLUTION INTRAMUSCULAR; INTRAVENOUS; SUBCUTANEOUS
Status: DISCONTINUED | OUTPATIENT
Start: 2021-09-11 | End: 2021-09-12 | Stop reason: HOSPADM

## 2021-09-11 RX ORDER — METHYLPREDNISOLONE SODIUM SUCCINATE 125 MG/2ML
62.5 INJECTION, POWDER, LYOPHILIZED, FOR SOLUTION INTRAMUSCULAR; INTRAVENOUS EVERY 8 HOURS
Status: DISCONTINUED | OUTPATIENT
Start: 2021-09-11 | End: 2021-09-12

## 2021-09-11 RX ORDER — PROMETHAZINE HYDROCHLORIDE 25 MG/1
12.5-25 TABLET ORAL EVERY 4 HOURS PRN
Status: DISCONTINUED | OUTPATIENT
Start: 2021-09-11 | End: 2021-09-12 | Stop reason: HOSPADM

## 2021-09-11 RX ORDER — EPINEPHRINE 1 MG/ML(1)
0.5 AMPUL (ML) INJECTION ONCE
Status: COMPLETED | OUTPATIENT
Start: 2021-09-11 | End: 2021-09-11

## 2021-09-11 RX ORDER — ENALAPRILAT 1.25 MG/ML
1.25 INJECTION INTRAVENOUS EVERY 6 HOURS PRN
Status: DISCONTINUED | OUTPATIENT
Start: 2021-09-11 | End: 2021-09-12

## 2021-09-11 RX ORDER — DIPHENHYDRAMINE HYDROCHLORIDE 50 MG/ML
25 INJECTION INTRAMUSCULAR; INTRAVENOUS EVERY 6 HOURS PRN
Status: DISCONTINUED | OUTPATIENT
Start: 2021-09-11 | End: 2021-09-12 | Stop reason: HOSPADM

## 2021-09-11 RX ORDER — PROMETHAZINE HYDROCHLORIDE 12.5 MG/1
12.5-25 SUPPOSITORY RECTAL EVERY 4 HOURS PRN
Status: DISCONTINUED | OUTPATIENT
Start: 2021-09-11 | End: 2021-09-12 | Stop reason: HOSPADM

## 2021-09-11 RX ORDER — DEXTROSE MONOHYDRATE 25 G/50ML
50 INJECTION, SOLUTION INTRAVENOUS
Status: DISCONTINUED | OUTPATIENT
Start: 2021-09-11 | End: 2021-09-12 | Stop reason: HOSPADM

## 2021-09-11 RX ORDER — PREDNISONE 20 MG/1
60 TABLET ORAL DAILY
Qty: 15 TABLET | Refills: 0 | Status: SHIPPED | OUTPATIENT
Start: 2021-09-11 | End: 2021-09-12 | Stop reason: SDUPTHER

## 2021-09-11 RX ORDER — LEVOTHYROXINE SODIUM 0.05 MG/1
50 TABLET ORAL
COMMUNITY
Start: 2021-09-02

## 2021-09-11 RX ORDER — COSYNTROPIN 0.25 MG/ML
250 INJECTION, POWDER, FOR SOLUTION INTRAMUSCULAR; INTRAVENOUS ONCE
Status: COMPLETED | OUTPATIENT
Start: 2021-09-12 | End: 2021-09-12

## 2021-09-11 RX ORDER — HYDROXYZINE HYDROCHLORIDE 25 MG/1
25-50 TABLET, FILM COATED ORAL
Status: ON HOLD | COMMUNITY
Start: 2021-09-03 | End: 2021-09-12

## 2021-09-11 RX ORDER — AMOXICILLIN 250 MG
2 CAPSULE ORAL 2 TIMES DAILY
Status: DISCONTINUED | OUTPATIENT
Start: 2021-09-11 | End: 2021-09-12 | Stop reason: HOSPADM

## 2021-09-11 RX ORDER — ONDANSETRON 4 MG/1
4 TABLET, ORALLY DISINTEGRATING ORAL EVERY 4 HOURS PRN
Status: DISCONTINUED | OUTPATIENT
Start: 2021-09-11 | End: 2021-09-12 | Stop reason: HOSPADM

## 2021-09-11 RX ORDER — ACETAMINOPHEN 325 MG/1
650 TABLET ORAL EVERY 4 HOURS PRN
COMMUNITY

## 2021-09-11 RX ORDER — ACETAMINOPHEN 325 MG/1
650 TABLET ORAL EVERY 6 HOURS PRN
Status: DISCONTINUED | OUTPATIENT
Start: 2021-09-11 | End: 2021-09-12 | Stop reason: HOSPADM

## 2021-09-11 RX ORDER — PROCHLORPERAZINE EDISYLATE 5 MG/ML
5-10 INJECTION INTRAMUSCULAR; INTRAVENOUS EVERY 4 HOURS PRN
Status: DISCONTINUED | OUTPATIENT
Start: 2021-09-11 | End: 2021-09-12 | Stop reason: HOSPADM

## 2021-09-11 RX ORDER — SODIUM CHLORIDE, SODIUM LACTATE, POTASSIUM CHLORIDE, CALCIUM CHLORIDE 600; 310; 30; 20 MG/100ML; MG/100ML; MG/100ML; MG/100ML
INJECTION, SOLUTION INTRAVENOUS CONTINUOUS
Status: ACTIVE | OUTPATIENT
Start: 2021-09-11 | End: 2021-09-12

## 2021-09-11 RX ORDER — POLYETHYLENE GLYCOL 3350 17 G/17G
1 POWDER, FOR SOLUTION ORAL
Status: DISCONTINUED | OUTPATIENT
Start: 2021-09-11 | End: 2021-09-12 | Stop reason: HOSPADM

## 2021-09-11 RX ORDER — LABETALOL HYDROCHLORIDE 5 MG/ML
10 INJECTION, SOLUTION INTRAVENOUS EVERY 4 HOURS PRN
Status: DISCONTINUED | OUTPATIENT
Start: 2021-09-11 | End: 2021-09-12

## 2021-09-11 RX ORDER — SODIUM CHLORIDE 9 MG/ML
1000 INJECTION, SOLUTION INTRAVENOUS ONCE
Status: COMPLETED | OUTPATIENT
Start: 2021-09-11 | End: 2021-09-11

## 2021-09-11 RX ORDER — OXYCODONE HYDROCHLORIDE 5 MG/1
2.5 TABLET ORAL
Status: DISCONTINUED | OUTPATIENT
Start: 2021-09-11 | End: 2021-09-12 | Stop reason: HOSPADM

## 2021-09-11 RX ORDER — BISACODYL 10 MG
10 SUPPOSITORY, RECTAL RECTAL
Status: DISCONTINUED | OUTPATIENT
Start: 2021-09-11 | End: 2021-09-12 | Stop reason: HOSPADM

## 2021-09-11 RX ORDER — CLONIDINE HYDROCHLORIDE 0.1 MG/1
0.1 TABLET ORAL EVERY 6 HOURS PRN
Status: DISCONTINUED | OUTPATIENT
Start: 2021-09-11 | End: 2021-09-12

## 2021-09-11 RX ORDER — CEFTRIAXONE 2 G/1
2 INJECTION, POWDER, FOR SOLUTION INTRAMUSCULAR; INTRAVENOUS ONCE
Status: COMPLETED | OUTPATIENT
Start: 2021-09-11 | End: 2021-09-11

## 2021-09-11 RX ORDER — ONDANSETRON 2 MG/ML
4 INJECTION INTRAMUSCULAR; INTRAVENOUS EVERY 4 HOURS PRN
Status: DISCONTINUED | OUTPATIENT
Start: 2021-09-11 | End: 2021-09-12 | Stop reason: HOSPADM

## 2021-09-11 RX ORDER — OXYCODONE HYDROCHLORIDE 5 MG/1
5 TABLET ORAL
Status: DISCONTINUED | OUTPATIENT
Start: 2021-09-11 | End: 2021-09-12 | Stop reason: HOSPADM

## 2021-09-11 RX ORDER — IBUPROFEN 200 MG
200 TABLET ORAL EVERY 6 HOURS PRN
COMMUNITY

## 2021-09-11 RX ORDER — CETIRIZINE HYDROCHLORIDE 10 MG/1
10 TABLET ORAL DAILY
Status: DISCONTINUED | OUTPATIENT
Start: 2021-09-12 | End: 2021-09-12

## 2021-09-11 RX ORDER — ATORVASTATIN CALCIUM 10 MG/1
10 TABLET, FILM COATED ORAL DAILY
Status: DISCONTINUED | OUTPATIENT
Start: 2021-09-12 | End: 2021-09-11

## 2021-09-11 RX ORDER — DEXAMETHASONE SODIUM PHOSPHATE 4 MG/ML
6 INJECTION, SOLUTION INTRA-ARTICULAR; INTRALESIONAL; INTRAMUSCULAR; INTRAVENOUS; SOFT TISSUE ONCE
Status: COMPLETED | OUTPATIENT
Start: 2021-09-11 | End: 2021-09-11

## 2021-09-11 RX ADMIN — METHYLPREDNISOLONE SODIUM SUCCINATE 62.5 MG: 125 INJECTION, POWDER, FOR SOLUTION INTRAMUSCULAR; INTRAVENOUS at 23:31

## 2021-09-11 RX ADMIN — EPINEPHRINE 0.5 MG: 1 INJECTION INTRAMUSCULAR; INTRAVENOUS; SUBCUTANEOUS at 19:01

## 2021-09-11 RX ADMIN — SODIUM CHLORIDE, POTASSIUM CHLORIDE, SODIUM LACTATE AND CALCIUM CHLORIDE: 600; 310; 30; 20 INJECTION, SOLUTION INTRAVENOUS at 19:43

## 2021-09-11 RX ADMIN — FAMOTIDINE 20 MG: 10 INJECTION INTRAVENOUS at 19:03

## 2021-09-11 RX ADMIN — DIPHENHYDRAMINE HYDROCHLORIDE 25 MG: 50 INJECTION INTRAMUSCULAR; INTRAVENOUS at 19:02

## 2021-09-11 RX ADMIN — DEXAMETHASONE SODIUM PHOSPHATE 6 MG: 4 INJECTION, SOLUTION INTRA-ARTICULAR; INTRALESIONAL; INTRAMUSCULAR; INTRAVENOUS; SOFT TISSUE at 17:18

## 2021-09-11 RX ADMIN — CEFTRIAXONE SODIUM 2 G: 2 INJECTION, POWDER, FOR SOLUTION INTRAMUSCULAR; INTRAVENOUS at 19:37

## 2021-09-11 RX ADMIN — SODIUM CHLORIDE 1000 ML: 9 INJECTION, SOLUTION INTRAVENOUS at 19:51

## 2021-09-11 ASSESSMENT — ENCOUNTER SYMPTOMS
VOMITING: 0
HEARTBURN: 0
BLURRED VISION: 0
WHEEZING: 0
NAUSEA: 0
HEADACHES: 0
PALPITATIONS: 0
BLOOD IN STOOL: 0
DIARRHEA: 0
COUGH: 0
BACK PAIN: 0
DEPRESSION: 0
BRUISES/BLEEDS EASILY: 0
WEAKNESS: 0
SORE THROAT: 0
CHILLS: 0
FOCAL WEAKNESS: 0
CONSTIPATION: 0
DOUBLE VISION: 0
INSOMNIA: 0
FEVER: 0
NECK PAIN: 0
SHORTNESS OF BREATH: 1
LOSS OF CONSCIOUSNESS: 0
ABDOMINAL PAIN: 0

## 2021-09-11 ASSESSMENT — LIFESTYLE VARIABLES
EVER FELT BAD OR GUILTY ABOUT YOUR DRINKING: NO
TOTAL SCORE: 0
EVER HAD A DRINK FIRST THING IN THE MORNING TO STEADY YOUR NERVES TO GET RID OF A HANGOVER: NO
ALCOHOL_USE: NO
HOW MANY TIMES IN THE PAST YEAR HAVE YOU HAD 5 OR MORE DRINKS IN A DAY: 0
ON A TYPICAL DAY WHEN YOU DRINK ALCOHOL HOW MANY DRINKS DO YOU HAVE: 0
DOES PATIENT WANT TO STOP DRINKING: NO
HAVE YOU EVER FELT YOU SHOULD CUT DOWN ON YOUR DRINKING: NO
CONSUMPTION TOTAL: NEGATIVE
HAVE PEOPLE ANNOYED YOU BY CRITICIZING YOUR DRINKING: NO
AVERAGE NUMBER OF DAYS PER WEEK YOU HAVE A DRINK CONTAINING ALCOHOL: 0
TOTAL SCORE: 0
TOTAL SCORE: 0

## 2021-09-11 ASSESSMENT — COGNITIVE AND FUNCTIONAL STATUS - GENERAL
SUGGESTED CMS G CODE MODIFIER MOBILITY: CJ
DAILY ACTIVITIY SCORE: 22
SUGGESTED CMS G CODE MODIFIER DAILY ACTIVITY: CJ
TOILETING: A LITTLE
CLIMB 3 TO 5 STEPS WITH RAILING: A LITTLE
HELP NEEDED FOR BATHING: A LITTLE
WALKING IN HOSPITAL ROOM: A LITTLE
STANDING UP FROM CHAIR USING ARMS: A LITTLE
MOBILITY SCORE: 21

## 2021-09-11 ASSESSMENT — PATIENT HEALTH QUESTIONNAIRE - PHQ9
2. FEELING DOWN, DEPRESSED, IRRITABLE, OR HOPELESS: NOT AT ALL
1. LITTLE INTEREST OR PLEASURE IN DOING THINGS: NOT AT ALL
SUM OF ALL RESPONSES TO PHQ9 QUESTIONS 1 AND 2: 0

## 2021-09-11 ASSESSMENT — FIBROSIS 4 INDEX: FIB4 SCORE: 0.98

## 2021-09-11 NOTE — ED TRIAGE NOTES
Chief Complaint   Patient presents with   • Rash     pt states rash started early this morning with a few dots on her back but the rash exponentially got worse by today. pt has rash all over her body.    • Allergic Reaction       Pt walk in for above. Pt last had this happen in June secondary to allopurinol and was diagnosed with dress syndrome. Pt has a softer voice but can speak full sentences. Pt states she did have difficulty drinking water on the way here. Pt took zyrtec at home. Pt aox4, gcs 15.      /85   Pulse (!) 113   Temp 37 °C (98.6 °F) (Temporal)   Resp 18   Ht 1.524 m (5')   Wt 72.4 kg (159 lb 9.8 oz)   SpO2 97%   BMI 31.17 kg/m²

## 2021-09-12 VITALS
HEIGHT: 60 IN | HEART RATE: 86 BPM | WEIGHT: 159.61 LBS | OXYGEN SATURATION: 99 % | TEMPERATURE: 98.1 F | RESPIRATION RATE: 18 BRPM | DIASTOLIC BLOOD PRESSURE: 66 MMHG | SYSTOLIC BLOOD PRESSURE: 114 MMHG | BODY MASS INDEX: 31.34 KG/M2

## 2021-09-12 LAB
ALBUMIN SERPL BCP-MCNC: 3.6 G/DL (ref 3.2–4.9)
ALBUMIN/GLOB SERPL: 0.9 G/DL
ALP SERPL-CCNC: 62 U/L (ref 30–99)
ALT SERPL-CCNC: 86 U/L (ref 2–50)
ANION GAP SERPL CALC-SCNC: 12 MMOL/L (ref 7–16)
AST SERPL-CCNC: 90 U/L (ref 12–45)
BASOPHILS # BLD AUTO: 0.2 % (ref 0–1.8)
BASOPHILS # BLD: 0.03 K/UL (ref 0–0.12)
BILIRUB SERPL-MCNC: 0.4 MG/DL (ref 0.1–1.5)
BUN SERPL-MCNC: 13 MG/DL (ref 8–22)
CALCIUM SERPL-MCNC: 8.9 MG/DL (ref 8.5–10.5)
CHLORIDE SERPL-SCNC: 105 MMOL/L (ref 96–112)
CO2 SERPL-SCNC: 18 MMOL/L (ref 20–33)
CORTIS SERPL-MCNC: 15.4 UG/DL (ref 0–23)
CORTIS SERPL-MCNC: 30 UG/DL (ref 0–23)
CORTIS SERPL-MCNC: 33.8 UG/DL (ref 0–23)
CREAT SERPL-MCNC: 0.94 MG/DL (ref 0.5–1.4)
EKG IMPRESSION: NORMAL
EOSINOPHIL # BLD AUTO: 0.01 K/UL (ref 0–0.51)
EOSINOPHIL NFR BLD: 0.1 % (ref 0–6.9)
ERYTHROCYTE [DISTWIDTH] IN BLOOD BY AUTOMATED COUNT: 44.3 FL (ref 35.9–50)
GLOBULIN SER CALC-MCNC: 3.8 G/DL (ref 1.9–3.5)
GLUCOSE BLD-MCNC: 144 MG/DL (ref 65–99)
GLUCOSE BLD-MCNC: 152 MG/DL (ref 65–99)
GLUCOSE SERPL-MCNC: 196 MG/DL (ref 65–99)
HCT VFR BLD AUTO: 41.5 % (ref 37–47)
HGB BLD-MCNC: 13.5 G/DL (ref 12–16)
IMM GRANULOCYTES # BLD AUTO: 0.06 K/UL (ref 0–0.11)
IMM GRANULOCYTES NFR BLD AUTO: 0.5 % (ref 0–0.9)
LACTATE BLD-SCNC: 2.9 MMOL/L (ref 0.5–2)
LACTATE BLD-SCNC: 4 MMOL/L (ref 0.5–2)
LYMPHOCYTES # BLD AUTO: 0.92 K/UL (ref 1–4.8)
LYMPHOCYTES NFR BLD: 7 % (ref 22–41)
MCH RBC QN AUTO: 31.6 PG (ref 27–33)
MCHC RBC AUTO-ENTMCNC: 32.5 G/DL (ref 33.6–35)
MCV RBC AUTO: 97.2 FL (ref 81.4–97.8)
MONOCYTES # BLD AUTO: 0.11 K/UL (ref 0–0.85)
MONOCYTES NFR BLD AUTO: 0.8 % (ref 0–13.4)
NEUTROPHILS # BLD AUTO: 12.02 K/UL (ref 2–7.15)
NEUTROPHILS NFR BLD: 91.4 % (ref 44–72)
NRBC # BLD AUTO: 0 K/UL
NRBC BLD-RTO: 0 /100 WBC
PLATELET # BLD AUTO: 390 K/UL (ref 164–446)
PMV BLD AUTO: 9.2 FL (ref 9–12.9)
POTASSIUM SERPL-SCNC: 4.5 MMOL/L (ref 3.6–5.5)
PROT SERPL-MCNC: 7.4 G/DL (ref 6–8.2)
RBC # BLD AUTO: 4.27 M/UL (ref 4.2–5.4)
SODIUM SERPL-SCNC: 135 MMOL/L (ref 135–145)
WBC # BLD AUTO: 13.2 K/UL (ref 4.8–10.8)

## 2021-09-12 PROCEDURE — 700111 HCHG RX REV CODE 636 W/ 250 OVERRIDE (IP): Performed by: STUDENT IN AN ORGANIZED HEALTH CARE EDUCATION/TRAINING PROGRAM

## 2021-09-12 PROCEDURE — 700102 HCHG RX REV CODE 250 W/ 637 OVERRIDE(OP): Performed by: STUDENT IN AN ORGANIZED HEALTH CARE EDUCATION/TRAINING PROGRAM

## 2021-09-12 PROCEDURE — 36415 COLL VENOUS BLD VENIPUNCTURE: CPT

## 2021-09-12 PROCEDURE — 700111 HCHG RX REV CODE 636 W/ 250 OVERRIDE (IP): Performed by: EMERGENCY MEDICINE

## 2021-09-12 PROCEDURE — 99239 HOSP IP/OBS DSCHRG MGMT >30: CPT | Performed by: STUDENT IN AN ORGANIZED HEALTH CARE EDUCATION/TRAINING PROGRAM

## 2021-09-12 PROCEDURE — 83605 ASSAY OF LACTIC ACID: CPT

## 2021-09-12 PROCEDURE — A9270 NON-COVERED ITEM OR SERVICE: HCPCS | Performed by: STUDENT IN AN ORGANIZED HEALTH CARE EDUCATION/TRAINING PROGRAM

## 2021-09-12 PROCEDURE — 82962 GLUCOSE BLOOD TEST: CPT

## 2021-09-12 PROCEDURE — 85025 COMPLETE CBC W/AUTO DIFF WBC: CPT

## 2021-09-12 PROCEDURE — 80053 COMPREHEN METABOLIC PANEL: CPT

## 2021-09-12 PROCEDURE — 93010 ELECTROCARDIOGRAM REPORT: CPT | Performed by: INTERNAL MEDICINE

## 2021-09-12 PROCEDURE — 82533 TOTAL CORTISOL: CPT | Mod: 91

## 2021-09-12 RX ORDER — ALBUTEROL SULFATE 90 UG/1
2 AEROSOL, METERED RESPIRATORY (INHALATION) EVERY 4 HOURS PRN
Qty: 8 G | Refills: 1 | Status: SHIPPED | OUTPATIENT
Start: 2021-09-12

## 2021-09-12 RX ORDER — PREDNISONE 20 MG/1
40 TABLET ORAL DAILY
Qty: 6 TABLET | Refills: 0 | Status: SHIPPED | OUTPATIENT
Start: 2021-09-13 | End: 2021-09-16

## 2021-09-12 RX ORDER — PREDNISONE 20 MG/1
40 TABLET ORAL DAILY
Status: DISCONTINUED | OUTPATIENT
Start: 2021-09-12 | End: 2021-09-12 | Stop reason: HOSPADM

## 2021-09-12 RX ORDER — DIPHENHYDRAMINE HCL 25 MG
25-50 CAPSULE ORAL EVERY 6 HOURS PRN
Qty: 30 CAPSULE | COMMUNITY
Start: 2021-09-12 | End: 2022-07-23

## 2021-09-12 RX ORDER — ALBUTEROL SULFATE 90 UG/1
2 AEROSOL, METERED RESPIRATORY (INHALATION) EVERY 4 HOURS PRN
Status: DISCONTINUED | OUTPATIENT
Start: 2021-09-12 | End: 2021-09-12 | Stop reason: HOSPADM

## 2021-09-12 RX ORDER — DIPHENHYDRAMINE HCL 25 MG
25 TABLET ORAL ONCE
Status: COMPLETED | OUTPATIENT
Start: 2021-09-12 | End: 2021-09-12

## 2021-09-12 RX ADMIN — DIPHENHYDRAMINE HCL 25 MG: 25 TABLET ORAL at 09:31

## 2021-09-12 RX ADMIN — ENOXAPARIN SODIUM 40 MG: 40 INJECTION SUBCUTANEOUS at 05:36

## 2021-09-12 RX ADMIN — FAMOTIDINE 20 MG: 10 INJECTION INTRAVENOUS at 05:35

## 2021-09-12 RX ADMIN — PREDNISONE 40 MG: 20 TABLET ORAL at 09:31

## 2021-09-12 RX ADMIN — SENNOSIDES AND DOCUSATE SODIUM 2 TABLET: 50; 8.6 TABLET ORAL at 05:34

## 2021-09-12 RX ADMIN — METHYLPREDNISOLONE SODIUM SUCCINATE 62.5 MG: 125 INJECTION, POWDER, FOR SOLUTION INTRAMUSCULAR; INTRAVENOUS at 05:36

## 2021-09-12 RX ADMIN — COSYNTROPIN 250 MCG: 0.25 INJECTION, POWDER, LYOPHILIZED, FOR SOLUTION INTRAVENOUS at 05:35

## 2021-09-12 RX ADMIN — DIPHENHYDRAMINE HYDROCHLORIDE 25 MG: 50 INJECTION INTRAMUSCULAR; INTRAVENOUS at 05:36

## 2021-09-12 RX ADMIN — METOPROLOL TARTRATE 50 MG: 50 TABLET, FILM COATED ORAL at 05:35

## 2021-09-12 RX ADMIN — CETIRIZINE HYDROCHLORIDE 10 MG: 10 TABLET, FILM COATED ORAL at 05:35

## 2021-09-12 ASSESSMENT — PAIN DESCRIPTION - PAIN TYPE: TYPE: ACUTE PAIN

## 2021-09-12 NOTE — ASSESSMENT & PLAN NOTE
Previously diagnosed with dress syndrome and as of June 2021 had a significantly on CBC however not present today.  Patient does present with morbilliform rash and administer Decadron, epinephrine and Benadryl in ED.  Pending outpatient biopsy results from dermatology, from 3 months ago does reveal us an affiliate scattered on skin biopsy.  No airway compromise, Mallampati class III  With patient's current night sweats and presentation, we will rule out autoimmune disease processes with JOSE, C3, C4.

## 2021-09-12 NOTE — CARE PLAN
Problem: Knowledge Deficit - Standard  Goal: Patient and family/care givers will demonstrate understanding of plan of care, disease process/condition, diagnostic tests and medications  Outcome: Progressing     Problem: Respiratory  Goal: Patient will achieve/maintain optimum respiratory ventilation and gas exchange  Outcome: Progressing     The patient is Stable - Low risk of patient condition declining or worsening    Shift Goals  Clinical Goals: control itching  Patient Goals: rest and control itching    Pt is A&Ox4. Pt uses call light appropriately and makes needs known. Bed is locked and in the lowest position. Call light and personal belongings within reach. Pt has no complaints of pain or nausea. Pt is up with stand by assist to bathroom. Will continue to monitor and intervene as needed.

## 2021-09-12 NOTE — ASSESSMENT & PLAN NOTE
Likely secondary lactic acidosis and lactic acid levels ordered and pending  CMP in a.m.  No indication for bicarbonate infusion at this time

## 2021-09-12 NOTE — DISCHARGE INSTRUCTIONS
Discharge Instructions    Discharged to home by car with relative. Discharged via walking, hospital escort: Refused.  Special equipment needed: Not Applicable    Be sure to schedule a follow-up appointment with your primary care doctor or any specialists as instructed.     Discharge Plan:   Diet Plan: Discussed  Activity Level: Discussed  Confirmed Follow up Appointment: Patient to Call and Schedule Appointment  Confirmed Symptoms Management: Discussed  Medication Reconciliation Updated: Yes    I understand that a diet low in cholesterol, fat, and sodium is recommended for good health. Unless I have been given specific instructions below for another diet, I accept this instruction as my diet prescription.   Other diet: Cardiac/Diabetic    Special Instructions: None    Is patient discharged on Warfarin / Coumadin?   No     Depression / Suicide Risk    As you are discharged from this [facility] facility, it is important to learn how to keep safe from harming yourself.    Recognize the warning signs:  Abrupt changes in personality, positive or negative- including increase in energy   Giving away possessions  Change in eating patterns- significant weight changes-  positive or negative  Change in sleeping patterns- unable to sleep or sleeping all the time   Unwillingness or inability to communicate  Depression  Unusual sadness, discouragement and loneliness  Talk of wanting to die  Neglect of personal appearance   Rebelliousness- reckless behavior  Withdrawal from people/activities they love  Confusion- inability to concentrate     If you or a loved one observes any of these behaviors or has concerns about self-harm, here's what you can do:  Talk about it- your feelings and reasons for harming yourself  Remove any means that you might use to hurt yourself (examples: pills, rope, extension cords, firearm)  Get professional help from the community (Mental Health, Substance Abuse, psychological counseling)  Do not be  alone:Call your Safe Contact- someone whom you trust who will be there for you.  Call your local CRISIS HOTLINE 308-8222 or 060-979-8120  Call your local Children's Mobile Crisis Response Team [place] (344) 782-5443 or www.[employer]  Call the toll free National Suicide Prevention Hotlines   National Suicide Prevention Lifeline 107-073-AFTH (5564)  [facility] Line Network 800-SUICIDE (531-4319)      Discharge Instructions per Surendra Avalos M.D.    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    DIET: Regular - avoid foods that may cause itching    ACTIVITY: As tolerated    DIAGNOSIS: Drug-induced hypersensitivity syndrome    Return to ER if you faint for any reason, are unable to breathe, or have to use your epinephrine pen.                Drug Rash    A drug rash occurs when a medicine causes a change in the color or texture of the skin. It can develop minutes, hours, or days after you take the medicine. The rash may appear on a small area of skin or all over your body.  What are the causes?  This condition is usually caused by your body's reaction (allergy) to a medicine. It can also be caused by exposure to sunlight after taking a medicine that makes your skin sensitive to light.  Though any medicine can cause a rash or reaction, medicines that are more likely to cause rashes include:  · Penicillin.  · Antibiotic medicines.  · Medicines that treat seizures.  · Medicines that treat cancer (chemotherapy).  · Aspirin and other NSAIDs.  · Injectable dyes that contain iodine.  · Insulin.  What are the signs or symptoms?  Symptoms of this condition include:  · Redness.  · Tiny bumps.  · Peeling.  · Itching.  · Itchy welts (hives).  · Swelling.  How is this diagnosed?  This condition may be diagnosed based on:  · A physical exam.  · Tests to find out which medicine caused the rash. These tests may include:  ? Skin tests.  ? Blood  tests.  ? Challenge test. For this test, you stop taking all the medicines that you do not need to take. Then, you start taking them again by adding back one medicine at a time.  How is this treated?  This condition is treated with medicines, including:  · Antihistamine. This may be given to relieve itching.  · NSAIDs. These may be given to reduce swelling and to treat pain.  · A steroid medicine. This may be given to reduce swelling.  The rash usually goes away when you stop taking the medicine that caused it.  Follow these instructions at home:  · Take over-the-counter and prescription medicines only as told by your health care provider.  · Tell all your health care providers about any medicine reactions that you have had in the past.  · If your rash was caused by sensitivity to sunlight, and while your rash is healing:  ? Avoid being in the sun if possible, especially when it is strongest, usually between 10 a.m. and 4 p.m.  ? Cover your skin with pants, long sleeves, and a hat when you are exposed to sunlight.  · If you have hives:  ? Take a cool shower or use a cool compress to relieve itchiness.  ? Take over-the-counter antihistamines, as recommended by your health care provider, until the hives are gone. Hives are not contagious.  · Keep all follow-up visits as told by your health care provider. This is important.  Contact a health care provider if you have:  · A fever.  · A rash that is not going away.  · A rash that gets worse.  · A rash that comes back.  · Wheezing or coughing.  Get help right away if:  · You start to have breathing problems.  · You start to have shortness of breath.  · Your face or throat starts to swell.  · You have severe weakness with dizziness or fainting.  · You have chest pain.  These symptoms may represent a serious problem that is an emergency. Do not wait to see if the symptoms will go away. Get medical help right away. Call your local emergency services (911 in the [place] Do not  drive yourself to the hospital.  Summary  · A drug rash occurs when a medicine causes a change in the color or texture of the skin. The rash may appear on a small area of skin or all over your body.  · It can develop minutes, hours, or days after you take the medicine.  · Your health care provider will do various tests to determine what medicine caused your rash.  · The rash may be treated with medicine to relieve itching, swelling, and pain.  This information is not intended to replace advice given to you by your health care provider. Make sure you discuss any questions you have with your health care provider.  Document Released: 01/25/2006 Document Revised: 11/30/2018 Document Reviewed: 11/08/2018  [employer] Patient Education © 2020 [employer]        How to Use an Auto-Injector Pen  An auto-injector pen (pre-filled automatic epinephrine injection device) is a device that is used to deliver epinephrine to the body. Epinephrine is a medicine that is given as a shot (injection). It works by relaxing the muscles in the airways and tightening the blood vessels. It is used to treat:  · A life-threatening allergic reaction (anaphylaxis).  · Serious breathing problems, such as severe asthma attacks, some lung problems, and other emergency conditions.  An epinephrine injection can save your life. You should always carry an auto-injector pen with you if you are at risk for severe asthma attacks or anaphylaxis. You may hear other names for an auto-injector pen. They are epinephrine injection, epinephrine auto-injector pen, epinephrine pen, and automatic injection device.  What are the risks?  Using the auto-injector pen is safe. However, problems may arise, including:  · Damage to bone or tissue. Make sure that you correctly place the needle in the muscle of your outer thigh as told by your health care provider.  When should I use my auto-injector pen?  Use your auto-injector pen as soon as you think you are experiencing  anaphylaxis or a severe asthma attack. Anaphylaxis is very dangerous if it is not treated right away.  Signs and symptoms of anaphylaxis may include:  · Feeling warm in the face (flushed). This may include redness.  · Itchy, red, swollen areas of skin (hives).  · Swelling of the eyes, lips, face, mouth, tongue, or throat.  · Difficulty breathing, speaking, or swallowing.  · Noisy breathing (wheezing).  · Dizziness or light-headedness.  · Fainting.  · Pain or cramping in the abdomen.  · Vomiting.  · Diarrhea.  These symptoms may represent a serious problem that is an emergency. Do not wait to see if the symptoms will go away. Use your auto-injector pen as you have been instructed, and get medical help right away. Call your local emergency services (911 in the [place] Do not drive yourself to the hospital.  General tips for using an auto-injector pen    · Use epinephrine exactly as told by your health care provider. Do not inject it more often or in greater or smaller doses than your health care provider told you. Most auto-injector pens contain one dose of epinephrine. Some contain two doses.  · Use the auto-injector pen to give yourself an injection under your skin or into your muscle on the outer side of your thigh. Do not give yourself an injection into your buttocks or any other part of your body.  ? In an emergency, you can use your auto-injector pen through your clothing.  ? After you inject a dose of epinephrine, some liquid may remain in your auto-injector pen. This is normal.  · If you need to give yourself a second dose of epinephrine, give the second injection in another area on your outer thigh. Do not give two injections in exactly the same place on your body. This can lead to tissue damage.  · From time to time:  ? Check the expiration date on your auto-injector pen.  ? Check the solution to ensure that it is not cloudy and that there are no particles floating in it. If your auto-injector pen is   or if the solution is cloudy or has particles floating in it, throw it away and get a new one.  · Ask your health care provider how to safely get rid of used or  auto-injector pens.  · Talk with your pharmacist or health care provider if you have questions about how to inject epinephrine correctly.  Get help right away if:  · You inject epinephrine. You may need additional medical care, and you may need to be monitored for the side effects of epinephrine. The side effects include:  ? Difficulty breathing.  ? Fast or irregular heartbeat.  ? Nausea or vomiting.  ? Sweating.  ? Dizziness.  ? Nervousness or anxiety.  ? Weakness.  ? Pale skin.  ? Headache.  ? Shaking that does not stop.  Summary  · An auto-injector pen (pre-filled automatic epinephrine injection device) is a device that is used to deliver epinephrine to the body.  · An auto-injector pen is used to treat a life-threatening allergic reaction (anaphylaxis), asthma attack, or other emergency conditions.  · You should always carry an auto-injector pen with you if you are at risk for anaphylaxis or severe asthma attacks.  · Use of this device is safe. However, bone or tissue damage can occur if you do not follow instructions for injecting the medicine.  · Talk with your pharmacist or health care provider if you have questions about how to inject epinephrine correctly.  This information is not intended to replace advice given to you by your health care provider. Make sure you discuss any questions you have with your health care provider.  Document Released: 12/15/2001 Document Revised: 2020 Document Reviewed: 2020  [employer] Patient Education ©  [employer]

## 2021-09-12 NOTE — DISCHARGE SUMMARY
Discharge Summary    CHIEF COMPLAINT ON ADMISSION  Chief Complaint   Patient presents with   • Rash     pt states rash started early this morning with a few dots on her back but the rash exponentially got worse by today. pt has rash all over her body.    • Allergic Reaction       Reason for Admission  Allergic reaction     Admission Date  9/11/2021    CODE STATUS  Full Code    HPI & HOSPITAL COURSE  This is a 46 y.o. 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 She presented with SIRS based on tachypnea and tachycardia without signs of airway compromise. Her pruritic morbiliform rash was not painful and she had no signs of mucositis to suggest TEN/SJS. She was discovered to have mild transaminitis attributed to DILI. She received IV solumedrol and IV benadryl, which slightly improved her pruritus. Her dyspnea improved sufficient to ambulate independently without hypoxic respiratory failure. She was transitioned to oral steroids and benadryl to complete a 5-day course. She was provided with an albuterol inhaler and epinephrine pen in case of anaphylactic reaction and instructed to follow up with her dermatologist in [place] for results of prior skin biopsy and referral to an immunologist.    Therefore, she is discharged in good and stable condition to home with close outpatient follow-up.    The patient recovered much more quickly than anticipated on admission.    Discharge Date  9/12/2021    FOLLOW UP ITEMS POST DISCHARGE  1. Drug hypersensitivity - follow up with dermatologist for referral to immunologist, avoid any unnecessary medications    DISCHARGE DIAGNOSES  Principal Problem:    Sepsis (HCC) POA: Yes  Active Problems:    Metabolic acidosis, increased anion gap (IAG) POA: Yes    DRESS syndrome POA: Yes    Diabetes (HCC) POA: Yes    Hypothyroidism POA: Yes    Transaminitis POA: Yes    Hypotension POA:  "Yes    Class 1 obesity due to excess calories in adult POA: Yes  Resolved Problems:    * No resolved hospital problems. *      FOLLOW UP  No future appointments.  No follow-up provider specified.    MEDICATIONS ON DISCHARGE     Medication List      START taking these medications      Instructions   EPINEPHrine 0.3 MG/0.3ML Sosy   Inject the contents of the epipen into the thigh, hold for 3 seconds and release from thigh.     predniSONE 20 MG Tabs  Start taking on: September 13, 2021  Commonly known as: DELTASONE   Take 2 Tablets by mouth every day for 3 days.  Dose: 40 mg        CONTINUE taking these medications      Instructions   acetaminophen 325 MG Tabs  Commonly known as: Tylenol   Take 650 mg by mouth every four hours as needed for Mild Pain.  Dose: 650 mg     diphenhydrAMINE 25 MG capsule  Commonly known as: BENADRYL   Take 1-2 Capsules by mouth every 6 hours as needed for Itching.  Dose: 25-50 mg     ibuprofen 200 MG Tabs  Commonly known as: MOTRIN   Take 200 mg by mouth every 6 hours as needed for Headache.  Dose: 200 mg     levothyroxine 50 MCG Tabs  Commonly known as: SYNTHROID   Take 50 mcg by mouth every Monday, Wednesday, and Friday.  Dose: 50 mcg     nebivolol 10 MG Tabs  Commonly known as: BYSTOLIC   Take 10 mg by mouth every day.  Dose: 10 mg     Vitamin B Complex Tabs   Take 1 tablet by mouth every day.  Dose: 1 Tablet        STOP taking these medications    cetirizine 10 MG Tabs  Commonly known as: ZYRTEC     hydrOXYzine HCl 25 MG Tabs  Commonly known as: ATARAX            Allergies  Allergies   Allergen Reactions   • Acetazolamide      Tachycardia, dyspnea  Tachycardia, dyspnea  (taking meds for high altitude)     • Allopurinol      \"rash\"       DIET  Orders Placed This Encounter   Procedures   • Diet Order Diet: Cardiac; Second Modifier: (optional): Consistent CHO (Diabetic)     Standing Status:   Standing     Number of Occurrences:   1     Order Specific Question:   Diet:     Answer:   Cardiac " [6]     Order Specific Question:   Second Modifier: (optional)     Answer:   Consistent CHO (Diabetic) [4]       ACTIVITY  As tolerated.  Weight bearing as tolerated    CONSULTATIONS  None    PROCEDURES  None    LABORATORY  Lab Results   Component Value Date    SODIUM 135 09/12/2021    POTASSIUM 4.5 09/12/2021    CHLORIDE 105 09/12/2021    CO2 18 (L) 09/12/2021    GLUCOSE 196 (H) 09/12/2021    BUN 13 09/12/2021    CREATININE 0.94 09/12/2021        Lab Results   Component Value Date    WBC 13.2 (H) 09/12/2021    HEMOGLOBIN 13.5 09/12/2021    HEMATOCRIT 41.5 09/12/2021    PLATELETCT 390 09/12/2021        Total time of the discharge process exceeds 45 minutes.

## 2021-09-12 NOTE — ASSESSMENT & PLAN NOTE
This is Sepsis Present on admission  SIRS criteria identified on my evaluation include: Tachycardia, with heart rate greater than 90 BPM, Tachypnea, with respirations greater than 20 per minute and Leukocytosis, with WBC greater than 12,000  Source is Unknown  Sepsis protocol initiated  Fluid resuscitation ordered per protocol  IV antibiotics as appropriate for source of sepsis  While organ dysfunction may be noted elsewhere in this problem list or in the chart, degree of organ dysfunction does not meet CMS criteria for severe sepsis  Procalcitonin ordered and pending, questionable sepsis however does have night sweats without febrile illness.  On last presentation patient did have eosinophilia however not present on this CBC which does reveal neutrophilia and monocytosis.  Urinalysis ordered and pending to rule out any urinary tract infection however denies dysuria/hematuria or increased frequency or urgency.  She denies any shortness of breath cough with sputum production.  Patient will be administered one dose of Rocephin and if procalcitonin elevated then we will continue IV antibiotics for continuation of sepsis protocol.

## 2021-09-12 NOTE — ASSESSMENT & PLAN NOTE
TSH and free T4 ordered and pending  Continue Synthroid 88 mcg p.o. daily  Follow-up outpatient PCP and endocrinology after discharge

## 2021-09-12 NOTE — ASSESSMENT & PLAN NOTE
As patient has been on systemic steroids chronically and no longer taking, we will rule out adrenal insufficiency.  Cosyntropin 250 mcg IV x1 at 6 AM  Cortisol levels at 6 AM, 6:30 AM and 7:00 AM  Lactated Ringer bolus/infusion subsequently  Consider transfer to ICU if MAP less than 65 for pressor support.

## 2021-09-12 NOTE — ED PROVIDER NOTES
ED Provider Note    CHIEF COMPLAINT  Chief Complaint   Patient presents with   • Rash     pt states rash started early this morning with a few dots on her back but the rash exponentially got worse by today. pt has rash all over her body.    • Allergic Reaction       HPI  Bekah Morrell is a 46 y.o. female who presents to the Emergency Department after recently being admitted in June for 4 days due to rash with fever cough and nausea.  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  She has seen a dermatologist in [place] and had a biopsy on the third with no results known.  She was started by the dermatologist 2 days ago on hydroxyzine.  She started having a rash this morning with a few dots on her back but it suddenly worsened to extending all over her body.  She does feel like there is some difficulty breathing and increased pruritus.  She has had no known fevers.  She has not been vomiting she denies any abdominal pain.      REVIEW OF SYSTEMS  As above, all other systems negative.  PAST MEDICAL HISTORY   has a past medical history of Diabetes (HCC), High cholesterol, Hypertension, and Hypothyroid.    SOCIAL HISTORY  Social History     Tobacco Use   • Smoking status: Never Smoker   • Smokeless tobacco: Never Used   Substance and Sexual Activity   • Alcohol use: Never   • Drug use: Never   • Sexual activity: Not on file       SURGICAL HISTORY   has a past surgical history that includes cholecystectomy (07/2020); hysterectomy radical (2018); and primary c section (1991).    CURRENT MEDICATIONS  Reviewed.  See Encounter Summary.  Include   Home Medications    Medication Sig Taking? Last Dose Authorizing Provider   predniSONE (DELTASONE) 20 MG Tab Take 3 Tablets by mouth every day for 5 days. Yes  Gail Ramos M.D.   cetirizine (ZYRTEC) 10 MG Tab Take 10 mg by mouth every day.   Physician Outpatient   B Complex Vitamins (VITAMIN B COMPLEX)  "Tab Take 1 tablet by mouth every day.   Physician Outpatient   atorvastatin (LIPITOR) 10 MG Tab Take 10 mg by mouth every day.   Physician Outpatient   levothyroxine (SYNTHROID) 88 MCG Tab Take 88 mcg by mouth see administration instructions. Monday-Friday   Physician Outpatient   nebivolol (BYSTOLIC) 10 MG Tab Take 10 mg by mouth every day.   Physician Outpatient   SITagliptin (JANUVIA) 50 MG Tab Take 50 mg by mouth every day.   Physician Outpatient         ALLERGIES  Allergies   Allergen Reactions   • Acetazolamide      Tachycardia, dyspnea  Tachycardia, dyspnea  (taking meds for high altitude)     • Allopurinol      \"rash\"       PHYSICAL EXAM  VITAL SIGNS: /58   Pulse (!) 103   Temp 37 °C (98.6 °F) (Temporal)   Resp (!) 23   Ht 1.524 m (5')   Wt 72.4 kg (159 lb 9.8 oz)   SpO2 98%   BMI 31.17 kg/m²   Constitutional: Speaks in full sentences, Alert in no apparent distress.  HENT: Normocephalic, Bilateral external ears normal. Nose normal.   Eyes: Pupils are equal and reactive. Conjunctiva normal, non-icteric.   Thorax & Lungs: Easy unlabored respirations, no wheezes rhonchi or rales  Abdomen:  No gross signs of peritonitis, no pain with movement   Skin: Diffuse erythema, patient has raised variable shaped rash over her entire body, she has a lesion on her leg which is bluish in nature which is old, she has scaly chafed skin on her extremities  Extremities:   No edema, No asymmetry  Neurologic: Alert, Grossly non-focal.   Psychiatric: Affect and Mood normal      COURSE & MEDICAL DECISION MAKING  Nursing notes and vital signs were reviewed. (See chart for details)    The patient presents to the Emergency Department with concern for recurrence of her dress syndrome.  The patient presents with generalized erythema.  Her uvula shows no edema she is speaking with normal phonation, with her history of dress syndrome I did elect to check her baseline labs to look for worsened liver function or eosinophilia as " well as give her IV Decadron.    Results for orders placed or performed during the hospital encounter of 09/11/21   CBC WITH DIFFERENTIAL   Result Value Ref Range    WBC 13.0 (H) 4.8 - 10.8 K/uL    RBC 4.41 4.20 - 5.40 M/uL    Hemoglobin 13.9 12.0 - 16.0 g/dL    Hematocrit 40.7 37.0 - 47.0 %    MCV 92.3 81.4 - 97.8 fL    MCH 31.5 27.0 - 33.0 pg    MCHC 34.2 33.6 - 35.0 g/dL    RDW 42.1 35.9 - 50.0 fL    Platelet Count 480 (H) 164 - 446 K/uL    MPV 9.1 9.0 - 12.9 fL    Neutrophils-Polys 72.80 (H) 44.00 - 72.00 %    Lymphocytes 18.40 (L) 22.00 - 41.00 %    Monocytes 6.60 0.00 - 13.40 %    Eosinophils 1.50 0.00 - 6.90 %    Basophils 0.20 0.00 - 1.80 %    Immature Granulocytes 0.50 0.00 - 0.90 %    Nucleated RBC 0.00 /100 WBC    Neutrophils (Absolute) 9.46 (H) 2.00 - 7.15 K/uL    Lymphs (Absolute) 2.40 1.00 - 4.80 K/uL    Monos (Absolute) 0.86 (H) 0.00 - 0.85 K/uL    Eos (Absolute) 0.19 0.00 - 0.51 K/uL    Baso (Absolute) 0.03 0.00 - 0.12 K/uL    Immature Granulocytes (abs) 0.07 0.00 - 0.11 K/uL    NRBC (Absolute) 0.00 K/uL   Comp Metabolic Panel   Result Value Ref Range    Sodium 136 135 - 145 mmol/L    Potassium 3.8 3.6 - 5.5 mmol/L    Chloride 100 96 - 112 mmol/L    Co2 19 (L) 20 - 33 mmol/L    Anion Gap 17.0 (H) 7.0 - 16.0    Glucose 124 (H) 65 - 99 mg/dL    Bun 12 8 - 22 mg/dL    Creatinine 1.07 0.50 - 1.40 mg/dL    Calcium 9.3 8.5 - 10.5 mg/dL    AST(SGOT) 90 (H) 12 - 45 U/L    ALT(SGPT) 76 (H) 2 - 50 U/L    Alkaline Phosphatase 66 30 - 99 U/L    Total Bilirubin 0.7 0.1 - 1.5 mg/dL    Albumin 3.7 3.2 - 4.9 g/dL    Total Protein 7.4 6.0 - 8.2 g/dL    Globulin 3.7 (H) 1.9 - 3.5 g/dL    A-G Ratio 1.0 g/dL   ESTIMATED GFR   Result Value Ref Range    GFR If African American >60 >60 mL/min/1.73 m 2    GFR If Non African American 55 (A) >60 mL/min/1.73 m 2     Patient had no significant eosinophilia, her white blood cell count was slightly elevated her LFTs are improved from her hospitalization she has no evidence of  renal dysfunction.  She had received Decadron.  As I said no evidence of anaphylaxis the plan was to discharge her home with oral prednisone until she could talk to her dermatologist.  Unfortunately she dropped her pressure to 88/53.  I am concerned that she is developing anaphylaxis.  She will be given subcutaneous  Epinephrine as well as IV Benadryl and Pepcid.  With her history of prolonged allergic reactions I will speak to the hospitalist about admission to the hospital as she is unable to be discharged    DISPOSITION:  Admit        FINAL IMPRESSION   Anaphylaxis

## 2021-09-12 NOTE — ED NOTES
Upon attempting to d/c patient, repeat vitals show hypotension 80s SBP. Pt states she feels very tired. Pt rash appears to be getting better, although still has hives to bilateral arms and back. ERP notified.

## 2021-09-12 NOTE — PROGRESS NOTES
4 Eyes Skin Assessment Completed by Yenny RN and ALYSON Rey.    Head WDL  Ears Redness and Blanching  Nose WDL  Mouth WDL  Neck WDL  Breast/Chest WDL  Shoulder Blades: biopsy site with 1 stitch to R upper back  Spine WDL  (R) Arm/Elbow/Hand Edema  (L) Arm/Elbow/Hand Edema  Abdomen WDL  Groin WDL  Scrotum/Coccyx/Buttocks WDL  (R) Leg Edema  (L) Leg Edema  (R) Heel/Foot/Toe WDL  (L) Heel/Foot/Toe WDL          Devices In Places Pulse Ox      Interventions In Place Pressure Redistribution Mattress    Possible Skin Injury No    Pictures Uploaded Into Epic N/A  Wound Consult Placed N/A  RN Wound Prevention Protocol Ordered No

## 2021-09-12 NOTE — H&P
Hospital Medicine History & Physical Note    Date of Service  9/11/2021    Primary Care Physician  Pcp Pt States None    Consultants  None    Code Status  Full Code    Chief Complaint  Chief Complaint   Patient presents with   • Rash     pt states rash started early this morning with a few dots on her back but the rash exponentially got worse by today. pt has rash all over her body.    • Allergic Reaction       History of Presenting Illness  Bekah Morrell is a 46 y.o. female who presented 9/11/2021 with  at bedside.  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  Patient states that she had gone to dermatologist recently for biopsies on her back and was told that biopsy results are not back yet however blood work previously on admission in June consistent with dress syndrome.  Eosinophilia present on CBC at that time.  She presents today with morbilliform rash throughout body, substernal chest pressure and tightness and difficulty catching her breath.  She states the symptoms have now resolved however her rash seems to be worsening.  She states that she was started on Atarax yesterday and only took 2 doses before her rash erupted again.  She also admits to night sweats however denies any febrile illness and states that she has been fully vaccinated for COVID-19 with 2 shots of Moderna vaccination.  In ED, patient was administered IV Benadryl, epinephrine, Decadron.  She was noted to have map of 65 and there is some level of concern for adrenal insufficiency as patient had stopped taking her systemic steroids that she was on for the past 2 months.  Currently denies the following ROS: Substernal chest pain now resolved, pleuritic chest pain, shortness of breath, cough with sputum production, unintentional weight loss, lymphadenopathy, anosmia, ageusia, fevers, chills, hemoptysis, nausea, vomiting, hematemesis,  constipation of diarrhea, melena, hematochezia, dysuria, hematuria, incoordination vertigo, syncope, visual changes.    Vital signs at time of presentation were as follows: 98.2, 112, 18, 122/84, 98% room air.    Twelve-lead EKG has been ordered and currently pending.    Labs were obtained on admission and CBC was performed and reveals neutrophilic predominant leukocytosis with WBC of 13.0, thrombocytosis at 480 and mild monocytosis and mild lymphocytopenia.  No eosinophilia appreciated on CBC.  CMP performed and reveals anion gap metabolic acidosis with CO2 of 19, anion gap of 17 and mild transaminitis with AST of 90, ALT of 76.  Magnesium checked at level of 1.9.    Blood cultures obtained x2, procalcitonin ordered and pending, urinalysis ordered and pending.  Work-up for autoimmune disease inclusive of JOSE/C3/C4 ordered and currently pending.    Patient agrees to inpatient hospitalization for presumed sepsis/morbilliform rash.  She agrees to full CODE STATUS at time of evaluation and alert and oriented x4.  She will be optimized in ED and subsequently transferred to medical tobias floor for further optimization of medical management.    I discussed the plan of care with patient and family.    Review of Systems  Review of Systems   Constitutional: Negative for chills and fever.   HENT: Negative for congestion and sore throat.    Eyes: Negative for blurred vision and double vision.   Respiratory: Positive for shortness of breath (now resolved). Negative for cough and wheezing.    Cardiovascular: Positive for chest pain (now resolved). Negative for palpitations.   Gastrointestinal: Negative for abdominal pain, blood in stool, constipation, diarrhea, heartburn, melena, nausea and vomiting.   Genitourinary: Negative for dysuria and frequency.   Musculoskeletal: Negative for back pain and neck pain.   Skin: Positive for itching and rash.   Neurological: Negative for focal weakness, loss of consciousness, weakness and  "headaches.   Endo/Heme/Allergies: Negative for environmental allergies. Does not bruise/bleed easily.   Psychiatric/Behavioral: Negative for depression. The patient does not have insomnia.        Past Medical History   has a past medical history of Diabetes (HCC), DRESS syndrome, High cholesterol, Hypertension, and Hypothyroid.    Surgical History   has a past surgical history that includes cholecystectomy (07/2020); hysterectomy radical (2018); and primary c section (1991).     Family History  family history includes No Known Problems in her father and mother.   Family history reviewed with patient. There is no family history that is pertinent to the chief complaint.     Social History   reports that she has never smoked. She has never used smokeless tobacco. She reports that she does not drink alcohol and does not use drugs.    Allergies  Allergies   Allergen Reactions   • Acetazolamide      Tachycardia, dyspnea  Tachycardia, dyspnea  (taking meds for high altitude)     • Allopurinol      \"rash\"       Medications  Prior to Admission Medications   Prescriptions Last Dose Informant Patient Reported? Taking?   B Complex Vitamins (VITAMIN B COMPLEX) Tab  Patient Yes No   Sig: Take 1 tablet by mouth every day.   SITagliptin (JANUVIA) 50 MG Tab  Patient Yes No   Sig: Take 50 mg by mouth every day.   atorvastatin (LIPITOR) 10 MG Tab  Patient Yes No   Sig: Take 10 mg by mouth every day.   cetirizine (ZYRTEC) 10 MG Tab  Patient Yes No   Sig: Take 10 mg by mouth every day.   levothyroxine (SYNTHROID) 88 MCG Tab  Patient Yes No   Sig: Take 88 mcg by mouth see administration instructions. Monday-Friday   nebivolol (BYSTOLIC) 10 MG Tab  Patient Yes No   Sig: Take 10 mg by mouth every day.      Facility-Administered Medications: None       Physical Exam  Temp:  [36.8 °C (98.2 °F)-37.2 °C (98.9 °F)] 37.2 °C (98.9 °F)  Pulse:  [] 92  Resp:  [18-30] 25  BP: ()/(53-85) 101/62  SpO2:  [97 %-100 %] 100 %  Blood Pressure: " (!) 89/54   Temperature: 37.2 °C (98.9 °F)   Pulse: 88   Respiration: (!) 21   Pulse Oximetry: 98 %       Physical Exam  Constitutional:       Appearance: Normal appearance. She is obese.   HENT:      Head: Normocephalic and atraumatic.      Mouth/Throat:      Mouth: Mucous membranes are moist.      Pharynx: Oropharynx is clear. No posterior oropharyngeal erythema.      Comments: Mallampati class III  Eyes:      General: No scleral icterus.     Extraocular Movements: Extraocular movements intact.      Conjunctiva/sclera: Conjunctivae normal.      Pupils: Pupils are equal, round, and reactive to light.      Comments: Left eye injected   Neck:      Vascular: No carotid bruit.   Cardiovascular:      Rate and Rhythm: Normal rate and regular rhythm.      Pulses: Normal pulses.      Heart sounds: Normal heart sounds. No murmur heard.   No friction rub. No gallop.    Pulmonary:      Effort: Pulmonary effort is normal.      Breath sounds: Normal breath sounds. No wheezing, rhonchi or rales.   Abdominal:      General: Abdomen is flat. Bowel sounds are normal. There is no distension.      Palpations: There is no mass.      Tenderness: There is no abdominal tenderness. There is no rebound.   Musculoskeletal:         General: No swelling. Normal range of motion.      Cervical back: Normal range of motion.      Right lower leg: No edema.      Left lower leg: No edema.   Lymphadenopathy:      Cervical: No cervical adenopathy.   Skin:     General: Skin is warm and dry.      Capillary Refill: Capillary refill takes less than 2 seconds.      Findings: Rash (diffuse morbilliform rash) present. No erythema.   Neurological:      General: No focal deficit present.      Mental Status: She is alert and oriented to person, place, and time. Mental status is at baseline.      Cranial Nerves: No cranial nerve deficit.      Sensory: No sensory deficit.      Motor: No weakness.   Psychiatric:         Mood and Affect: Mood normal.          Behavior: Behavior normal.         Laboratory:  Recent Labs     09/11/21  1718   WBC 13.0*   RBC 4.41   HEMOGLOBIN 13.9   HEMATOCRIT 40.7   MCV 92.3   MCH 31.5   MCHC 34.2   RDW 42.1   PLATELETCT 480*   MPV 9.1     Recent Labs     09/11/21  1718   SODIUM 136   POTASSIUM 3.8   CHLORIDE 100   CO2 19*   GLUCOSE 124*   BUN 12   CREATININE 1.07   CALCIUM 9.3     Recent Labs     09/11/21  1718   ALTSGPT 76*   ASTSGOT 90*   ALKPHOSPHAT 66   TBILIRUBIN 0.7   GLUCOSE 124*     I have ordered twelve-lead EKG which is currently pending.        No results for input(s): NTPROBNP in the last 72 hours.      No results for input(s): TROPONINT in the last 72 hours.    Imaging:  No orders to display           Assessment/Plan:  I anticipate this patient will require at least two midnights for appropriate medical management, necessitating inpatient admission.    * Sepsis (HCC)- (present on admission)  Assessment & Plan  This is Sepsis Present on admission  SIRS criteria identified on my evaluation include: Tachycardia, with heart rate greater than 90 BPM, Tachypnea, with respirations greater than 20 per minute and Leukocytosis, with WBC greater than 12,000  Source is Unknown  Sepsis protocol initiated  Fluid resuscitation ordered per protocol  IV antibiotics as appropriate for source of sepsis  While organ dysfunction may be noted elsewhere in this problem list or in the chart, degree of organ dysfunction does not meet CMS criteria for severe sepsis  Procalcitonin ordered and pending, questionable sepsis however does have night sweats without febrile illness.  On last presentation patient did have eosinophilia however not present on this CBC which does reveal neutrophilia and monocytosis.  Urinalysis ordered and pending to rule out any urinary tract infection however denies dysuria/hematuria or increased frequency or urgency.  She denies any shortness of breath cough with sputum production.  Patient will be administered one dose of  Rocephin and if procalcitonin elevated then we will continue IV antibiotics for continuation of sepsis protocol.        DRESS syndrome- (present on admission)  Assessment & Plan  Previously diagnosed with dress syndrome and as of June 2021 had a significantly on CBC however not present today.  Patient does present with morbilliform rash and administer Decadron, epinephrine and Benadryl in ED.  Pending outpatient biopsy results from dermatology, from 3 months ago does reveal us an affiliate scattered on skin biopsy.  No airway compromise, Mallampati class III  With patient's current night sweats and presentation, we will rule out autoimmune disease processes with JOSE, C3, C4.    Hypotension- (present on admission)  Assessment & Plan  As patient has been on systemic steroids chronically and no longer taking, we will rule out adrenal insufficiency.  Cosyntropin 250 mcg IV x1 at 6 AM  Cortisol levels at 6 AM, 6:30 AM and 7:00 AM  Lactated Ringer bolus/infusion subsequently  Consider transfer to ICU if MAP less than 65 for pressor support.    Metabolic acidosis, increased anion gap (IAG)- (present on admission)  Assessment & Plan  Likely secondary lactic acidosis and lactic acid levels ordered and pending  CMP in a.m.  No indication for bicarbonate infusion at this time    Class 1 obesity due to excess calories in adult- (present on admission)  Assessment & Plan  Strong recommendation for outpatient follow-up with PCP for lifestyle modifications including diet/exercise regimen of at least 30 minutes brief cardiovascular exercise 3-5 times weekly.    Transaminitis- (present on admission)  Assessment & Plan  CMP in a.m.  Avoid hepatotoxic agents      Hypothyroidism- (present on admission)  Assessment & Plan  TSH and free T4 ordered and pending  Continue Synthroid 88 mcg p.o. daily  Follow-up outpatient PCP and endocrinology after discharge    Diabetes (HCC)- (present on admission)  Assessment & Plan  POC glucose QA  CHS  Low SSI  Follow-up outpatient PCP and endocrinology after discharge      VTE prophylaxis: enoxaparin ppx

## 2021-09-12 NOTE — ASSESSMENT & PLAN NOTE
Strong recommendation for outpatient follow-up with PCP for lifestyle modifications including diet/exercise regimen of at least 30 minutes brief cardiovascular exercise 3-5 times weekly.

## 2021-09-12 NOTE — ED NOTES
"Med Rec completed: per pt at bedside.     No ORAL antibiotics in last 30 days    Preferred Pharmacy: [facility] [place]     Pt confirmed following allergies:  Allergies   Allergen Reactions   • Acetazolamide      Tachycardia, dyspnea  Tachycardia, dyspnea  (taking meds for high altitude)     • Allopurinol      \"rash\"      Pt's home medications:   Medication Sig   • hydrOXYzine HCl (ATARAX) 25 MG Tab Take 25-50 mg by mouth at bedtime.   • levothyroxine (SYNTHROID) 50 MCG Tab Take 50 mcg by mouth every Monday, Wednesday, and Friday.   • ibuprofen (MOTRIN) 200 MG Tab Take 200 mg by mouth every 6 hours as needed for Headache.   • acetaminophen (TYLENOL) 325 MG Tab Take 650 mg by mouth every four hours as needed for Mild Pain.   • cetirizine (ZYRTEC) 10 MG Tab Take 10 mg by mouth every day.   • B Complex Vitamins (VITAMIN B COMPLEX) Tab Take 1 tablet by mouth every day.   • nebivolol (BYSTOLIC) 10 MG Tab Take 10 mg by mouth every day.     Removed medications:   Medication Removal Reason   • [DISCONTINUED] atorvastatin (LIPITOR) 10 MG Tab Pt reports last dose in June    • [DISCONTINUED] levothyroxine (SYNTHROID) 88 MCG Tab Dosage changed to 50 mcg daily   • [DISCONTINUED] SITagliptin (JANUVIA) 50 MG Tab Pt reports last dose in June        "

## 2021-09-12 NOTE — ASSESSMENT & PLAN NOTE
POC glucose QA [facility]  Low SSI  Follow-up outpatient PCP and endocrinology after discharge

## 2021-09-13 PROBLEM — D72.12 DRESS SYNDROME: Status: RESOLVED | Noted: 2021-06-16 | Resolved: 2021-09-13

## 2021-09-13 PROBLEM — R65.10 SIRS (SYSTEMIC INFLAMMATORY RESPONSE SYNDROME) (HCC): Status: ACTIVE | Noted: 2021-06-16

## 2021-09-13 PROBLEM — E87.29 METABOLIC ACIDOSIS, INCREASED ANION GAP (IAG): Status: RESOLVED | Noted: 2021-06-16 | Resolved: 2021-09-13

## 2021-09-13 PROBLEM — R65.10 SIRS (SYSTEMIC INFLAMMATORY RESPONSE SYNDROME) (HCC): Status: RESOLVED | Noted: 2021-06-16 | Resolved: 2021-09-13

## 2021-09-13 PROBLEM — T50.905A DRESS SYNDROME: Status: RESOLVED | Noted: 2021-06-16 | Resolved: 2021-09-13

## 2021-09-13 PROBLEM — I95.9 HYPOTENSION: Status: RESOLVED | Noted: 2021-09-11 | Resolved: 2021-09-13

## 2021-09-13 LAB — NUCLEAR IGG SER QL IA: NORMAL

## 2021-10-05 NOTE — DOCUMENTATION QUERY
[facility]                                                                       Query Response Note      PATIENT:               ASHLEY ZENG  ACCT #:                  [id number]  MRN:                     [id number]  :                      1975  ADMIT DATE:       2021 4:58 PM  DISCH DATE:        2021 3:22 PM  RESPONDING  PROVIDER #:        [id number]           QUERY TEXT:    Sepsis/SIRS is mentioned in the health record.    Can a diagnosis be provided based on the clinical indicators documented in the medical record.       NOTE:  If an appropriate response is not listed below, please respond with a new note.      The patient's Clinical Indicators include:  Clinical indicators  H&P- DRESS Synd. Sepsis: Tachycardia, Tachypnea, Leukocytosis. WBC 13.0*  Source is Unknown, questionable sepsis however does have night sweats without febrile illness.    DC- SIRS based on tachypnea and tachycardia without signs of airway compromise.   Principal Problem:Sepsis     Treatment or Monitoring  IV antibiotics     Risk Factors  DRESS syndrome, DM, obese      [email or web address]  Options provided:   -- Sepsis has been ruled in   -- Sepsis has been ruled out   -- SIRS that is unrelated to any infection   -- Unable to determine      Query created by: Speedy Calzada on 10/4/2021 2:23 PM    RESPONSE TEXT:    Sepsis has been ruled out          Electronically signed by:  TIESHA ALFARO MD 10/4/2021 6:16 PM

## 2021-12-10 ENCOUNTER — HOSPITAL ENCOUNTER (EMERGENCY)
Facility: MEDICAL CENTER | Age: 46
End: 2021-12-10
Attending: EMERGENCY MEDICINE
Payer: COMMERCIAL

## 2021-12-10 VITALS
DIASTOLIC BLOOD PRESSURE: 94 MMHG | SYSTOLIC BLOOD PRESSURE: 134 MMHG | OXYGEN SATURATION: 97 % | WEIGHT: 149.69 LBS | HEART RATE: 80 BPM | RESPIRATION RATE: 18 BRPM | TEMPERATURE: 98 F | BODY MASS INDEX: 29.23 KG/M2

## 2021-12-10 DIAGNOSIS — L50.9 URTICARIA: ICD-10-CM

## 2021-12-10 LAB
ALBUMIN SERPL BCP-MCNC: 4.1 G/DL (ref 3.2–4.9)
ALBUMIN/GLOB SERPL: 1.6 G/DL
ALP SERPL-CCNC: 58 U/L (ref 30–99)
ALT SERPL-CCNC: 8 U/L (ref 2–50)
ANION GAP SERPL CALC-SCNC: 14 MMOL/L (ref 7–16)
AST SERPL-CCNC: 13 U/L (ref 12–45)
BASOPHILS # BLD AUTO: 0.3 % (ref 0–1.8)
BASOPHILS # BLD: 0.04 K/UL (ref 0–0.12)
BILIRUB SERPL-MCNC: 0.6 MG/DL (ref 0.1–1.5)
BUN SERPL-MCNC: 13 MG/DL (ref 8–22)
CALCIUM SERPL-MCNC: 9.2 MG/DL (ref 8.5–10.5)
CHLORIDE SERPL-SCNC: 100 MMOL/L (ref 96–112)
CO2 SERPL-SCNC: 21 MMOL/L (ref 20–33)
CREAT SERPL-MCNC: 0.84 MG/DL (ref 0.5–1.4)
EOSINOPHIL # BLD AUTO: 0.09 K/UL (ref 0–0.51)
EOSINOPHIL NFR BLD: 0.7 % (ref 0–6.9)
ERYTHROCYTE [DISTWIDTH] IN BLOOD BY AUTOMATED COUNT: 41.5 FL (ref 35.9–50)
GLOBULIN SER CALC-MCNC: 2.5 G/DL (ref 1.9–3.5)
GLUCOSE SERPL-MCNC: 232 MG/DL (ref 65–99)
HCT VFR BLD AUTO: 43.4 % (ref 37–47)
HGB BLD-MCNC: 14.9 G/DL (ref 12–16)
IMM GRANULOCYTES # BLD AUTO: 0.09 K/UL (ref 0–0.11)
IMM GRANULOCYTES NFR BLD AUTO: 0.7 % (ref 0–0.9)
LYMPHOCYTES # BLD AUTO: 1.47 K/UL (ref 1–4.8)
LYMPHOCYTES NFR BLD: 11.1 % (ref 22–41)
MCH RBC QN AUTO: 29.7 PG (ref 27–33)
MCHC RBC AUTO-ENTMCNC: 34.3 G/DL (ref 33.6–35)
MCV RBC AUTO: 86.5 FL (ref 81.4–97.8)
MONOCYTES # BLD AUTO: 0.41 K/UL (ref 0–0.85)
MONOCYTES NFR BLD AUTO: 3.1 % (ref 0–13.4)
NEUTROPHILS # BLD AUTO: 11.16 K/UL (ref 2–7.15)
NEUTROPHILS NFR BLD: 84.1 % (ref 44–72)
NRBC # BLD AUTO: 0 K/UL
NRBC BLD-RTO: 0 /100 WBC
PLATELET # BLD AUTO: 315 K/UL (ref 164–446)
PMV BLD AUTO: 9.6 FL (ref 9–12.9)
POTASSIUM SERPL-SCNC: 3.7 MMOL/L (ref 3.6–5.5)
PROT SERPL-MCNC: 6.6 G/DL (ref 6–8.2)
RBC # BLD AUTO: 5.02 M/UL (ref 4.2–5.4)
SODIUM SERPL-SCNC: 135 MMOL/L (ref 135–145)
WBC # BLD AUTO: 13.3 K/UL (ref 4.8–10.8)

## 2021-12-10 PROCEDURE — 99284 EMERGENCY DEPT VISIT MOD MDM: CPT

## 2021-12-10 PROCEDURE — 96374 THER/PROPH/DIAG INJ IV PUSH: CPT

## 2021-12-10 PROCEDURE — 85025 COMPLETE CBC W/AUTO DIFF WBC: CPT

## 2021-12-10 PROCEDURE — 700111 HCHG RX REV CODE 636 W/ 250 OVERRIDE (IP): Performed by: EMERGENCY MEDICINE

## 2021-12-10 PROCEDURE — 80053 COMPREHEN METABOLIC PANEL: CPT

## 2021-12-10 RX ORDER — DIPHENHYDRAMINE HYDROCHLORIDE 50 MG/ML
25 INJECTION INTRAMUSCULAR; INTRAVENOUS ONCE
Status: COMPLETED | OUTPATIENT
Start: 2021-12-10 | End: 2021-12-10

## 2021-12-10 RX ADMIN — DIPHENHYDRAMINE HYDROCHLORIDE 25 MG: 50 INJECTION INTRAMUSCULAR; INTRAVENOUS at 10:36

## 2021-12-10 ASSESSMENT — FIBROSIS 4 INDEX: FIB4 SCORE: 1.19

## 2021-12-10 NOTE — ED NOTES
Patient has been updated of results and has been discharged home in stable condition by ERP.    Discharge paperwork has been provided to patient and gone over with patient by this nurse. Patient was given the opportunity to voice any questions or concerns and has verbalized understanding of discharge instructions with no questions or concerns.    Patient is A/Ox4 and vital signs are stable on discharge.    Discharge instructions/paperwork as well as all personal belongings are in possession of patient on discharge, no belongings were left behind in room.     Patient is ambulatory out of room with steady gait to lobby at this time with significant other at side. No belongings left behind. All belongings in possession of pt on discharge.

## 2021-12-10 NOTE — ED PROVIDER NOTES
ED Provider Note    ER PROVIDER NOTE        CHIEF COMPLAINT  Chief Complaint   Patient presents with   • Rash     generalized for 3 days, sl relief with benadryl       HPI  Bekah Morrell is a 46 y.o. female who presents to the emergency department complaining of rash.  Patient reports she has an itchy rash that started on her face and has now spread throughout most of her body.  Began 3 days ago, it does seem to wax and wane.  It is not painful, it is itchy, she reports no difficulty breathing, no recent illness fevers chills cough or congestion.  She did start Metformin 2 weeks ago, and Lantus.  She has had 3 similar episodes of this rash in the past several months, and 1 prior episode of DRESS (attributed to allopurinol) that did not look like this.  She notes no other known new prior exposures, medications, soaps, lotions, foods or other      REVIEW OF SYSTEMS  Pertinent positives include rash. Pertinent negatives include no fever. See HPI for details. All other systems reviewed and are negative.    PAST MEDICAL HISTORY   has a past medical history of Diabetes (HCC), DRESS syndrome, High cholesterol, Hypertension, and Hypothyroid.    SURGICAL HISTORY   has a past surgical history that includes cholecystectomy (07/2020); hysterectomy radical (2018); and primary c section (1991).    FAMILY HISTORY  Family History   Problem Relation Age of Onset   • No Known Problems Mother    • No Known Problems Father        SOCIAL HISTORY  Social History     Socioeconomic History   • Marital status:      Spouse name: Not on file   • Number of children: Not on file   • Years of education: Not on file   • Highest education level: Not on file   Occupational History   • Not on file   Tobacco Use   • Smoking status: Never Smoker   • Smokeless tobacco: Never Used   Substance and Sexual Activity   • Alcohol use: Never   • Drug use: Never   • Sexual activity: Not on file   Other Topics Concern   • Not on file  "  Social History Narrative   • Not on file     Social Determinants of Health     Financial Resource Strain:    • Difficulty of Paying Living Expenses: Not on file   Food Insecurity:    • Worried About Running Out of Food in the Last Year: Not on file   • Ran Out of Food in the Last Year: Not on file   Transportation Needs:    • Lack of Transportation (Medical): Not on file   • Lack of Transportation (Non-Medical): Not on file   Physical Activity:    • Days of Exercise per Week: Not on file   • Minutes of Exercise per Session: Not on file   Stress:    • Feeling of Stress : Not on file   Social Connections:    • Frequency of Communication with Friends and Family: Not on file   • Frequency of Social Gatherings with Friends and Family: Not on file   • Attends Yazidi Services: Not on file   • Active Member of Clubs or Organizations: Not on file   • Attends Club or Organization Meetings: Not on file   • Marital Status: Not on file   Intimate Partner Violence:    • Fear of Current or Ex-Partner: Not on file   • Emotionally Abused: Not on file   • Physically Abused: Not on file   • Sexually Abused: Not on file   Housing Stability:    • Unable to Pay for Housing in the Last Year: Not on file   • Number of Places Lived in the Last Year: Not on file   • Unstable Housing in the Last Year: Not on file      Social History     Substance and Sexual Activity   Drug Use Never       CURRENT MEDICATIONS  Home Medications    **Home medications have not yet been reviewed for this encounter**         ALLERGIES  Allergies   Allergen Reactions   • Acetazolamide      Tachycardia, dyspnea  Tachycardia, dyspnea  (taking meds for high altitude)     • Allopurinol      \"rash\"       PHYSICAL EXAM  VITAL SIGNS: /94   Pulse 80   Temp 36.7 °C (98 °F) (Tympanic)   Resp 18   Wt 67.9 kg (149 lb 11.1 oz)   SpO2 97%   BMI 29.23 kg/m²   Pulse ox interpretation: I interpret this pulse ox as normal.    Constitutional: Alert in no apparent " distress.  HENT: No signs of trauma, Bilateral external ears normal, Nose normal.   Eyes: Pupils are equal and reactive, Conjunctiva normal, Non-icteric.   Neck: Normal range of motion, No tenderness, Supple, No stridor. .   Cardiovascular: Regular rate and rhythm, no murmurs.   Thorax & Lungs: Normal breath sounds, No respiratory distress, No wheezing, No chest tenderness.   Abdomen: Bowel sounds normal, Soft, No tenderness, No masses, No pulsatile masses. No peritoneal signs.  Skin: Warm, Dry, No erythema, urticarial rash across the back, upper arms, abdomen as well as face, no lesions on the palms, no intraoral lesions, no skin breakdown or sloughing, no petechiae or purpura  Back: No bony tenderness, No CVA tenderness.   Extremities: Intact distal pulses, No edema, No tenderness, No cyanosis,  Musculoskeletal: Good range of motion in all major joints. No tenderness to palpation or major deformities noted.   Neurologic: Alert , Normal motor function, Normal sensory function, No focal deficits noted.   Psychiatric: Affect normal, Judgment normal, Mood normal.     DIAGNOSTIC STUDIES / PROCEDURES      LABS  Labs Reviewed   CBC WITH DIFFERENTIAL - Abnormal; Notable for the following components:       Result Value    WBC 13.3 (*)     Neutrophils-Polys 84.10 (*)     Lymphocytes 11.10 (*)     Neutrophils (Absolute) 11.16 (*)     All other components within normal limits    Narrative:     Collected By:   COMP METABOLIC PANEL - Abnormal; Notable for the following components:    Glucose 232 (*)     All other components within normal limits    Narrative:     Collected By:   ESTIMATED GFR    Narrative:     Collected By:       All labs reviewed by me.    RADIOLOGY  No orders to display     The radiologist's interpretation of all radiological studies have been reviewed and images independently viewed by me.    COURSE & MEDICAL DECISION MAKING  Nursing notes, VS, PMSFHx reviewed in chart.    9:31 AM Patient seen and examined at  bedside. Patient will be treated with bendaryl. Ordered for cbc, cmp to evaluate her symptoms.     12:01 PM  Patient is reevaluated, states feels much better, there is still some mild residual rash but much improved      Decision Making:  This is a 46 y.o. female present with a rash.  This does seem urticarial in nature although frankly without clear allergic precipitant.  She did improve here with Benadryl.  I did not give her steroid because she does have diabetes and her blood sugar is slightly high here today.  There is no evidence of anaphylaxis.  No evidence of infectious process, she does have a history of dress although symptoms and rash are not consistent with this today.  I have advised Benadryl at home.  It is possible that her Lantus is contributing to this as she did recently started 2 weeks ago and increased her dose just a few days ago.  We have discussed holding this for a few days continue to take her Metformin and monitoring her blood sugar very closely, will reinstitute immediately if it is running high and following up with her primary care doctor.       The patient will return for new or worsening symptoms and is stable at the time of discharge.    The patient is referred to a primary physician for blood pressure management, diabetic screening, and for all other preventative health concerns.        DISPOSITION:  Patient will be discharged home in stable condition.    FOLLOW UP:  with your primary care      Call to discuss her insulin on Monday      OUTPATIENT MEDICATIONS:  New Prescriptions    No medications on file         FINAL IMPRESSION  1. Urticaria       The note accurately reflects work and decisions made by me.  Ethan Iniguez M.D.  12/10/2021  12:03 PM

## 2021-12-10 NOTE — DISCHARGE INSTRUCTIONS
You can take Benadryl, 25 mg every 6 hours or 50 mg as needed for the next few days for your rash and itching.  As we discussed would consider stopping your Lantus over the weekend although you need to monitor your blood sugar very closely and restart if it is trending high.

## 2021-12-10 NOTE — ED TRIAGE NOTES
Chief Complaint   Patient presents with   • Rash     generalized for 3 days, sl relief with benadryl     Pt started new diabetes meds 2 weeks ago

## 2022-07-23 ENCOUNTER — HOSPITAL ENCOUNTER (EMERGENCY)
Facility: MEDICAL CENTER | Age: 47
End: 2022-07-23
Attending: EMERGENCY MEDICINE
Payer: COMMERCIAL

## 2022-07-23 VITALS
RESPIRATION RATE: 18 BRPM | WEIGHT: 139.77 LBS | SYSTOLIC BLOOD PRESSURE: 129 MMHG | BODY MASS INDEX: 27.44 KG/M2 | TEMPERATURE: 99.4 F | OXYGEN SATURATION: 95 % | DIASTOLIC BLOOD PRESSURE: 65 MMHG | HEIGHT: 60 IN | HEART RATE: 100 BPM

## 2022-07-23 DIAGNOSIS — T78.40XA ALLERGIC REACTION, INITIAL ENCOUNTER: ICD-10-CM

## 2022-07-23 LAB
ALBUMIN SERPL BCP-MCNC: 3.8 G/DL (ref 3.2–4.9)
ALBUMIN/GLOB SERPL: 1.4 G/DL
ALP SERPL-CCNC: 47 U/L (ref 30–99)
ALT SERPL-CCNC: 21 U/L (ref 2–50)
ANION GAP SERPL CALC-SCNC: 16 MMOL/L (ref 7–16)
AST SERPL-CCNC: 33 U/L (ref 12–45)
BASOPHILS # BLD AUTO: 0.2 % (ref 0–1.8)
BASOPHILS # BLD: 0.02 K/UL (ref 0–0.12)
BILIRUB SERPL-MCNC: 0.7 MG/DL (ref 0.1–1.5)
BUN SERPL-MCNC: 11 MG/DL (ref 8–22)
CALCIUM SERPL-MCNC: 8.9 MG/DL (ref 8.5–10.5)
CHLORIDE SERPL-SCNC: 98 MMOL/L (ref 96–112)
CO2 SERPL-SCNC: 19 MMOL/L (ref 20–33)
CREAT SERPL-MCNC: 1.28 MG/DL (ref 0.5–1.4)
EOSINOPHIL # BLD AUTO: 0 K/UL (ref 0–0.51)
EOSINOPHIL NFR BLD: 0 % (ref 0–6.9)
ERYTHROCYTE [DISTWIDTH] IN BLOOD BY AUTOMATED COUNT: 38.5 FL (ref 35.9–50)
GFR SERPLBLD CREATININE-BSD FMLA CKD-EPI: 51 ML/MIN/1.73 M 2
GLOBULIN SER CALC-MCNC: 2.8 G/DL (ref 1.9–3.5)
GLUCOSE SERPL-MCNC: 157 MG/DL (ref 65–99)
HCT VFR BLD AUTO: 42 % (ref 37–47)
HGB BLD-MCNC: 14.7 G/DL (ref 12–16)
IMM GRANULOCYTES # BLD AUTO: 0.08 K/UL (ref 0–0.11)
IMM GRANULOCYTES NFR BLD AUTO: 0.6 % (ref 0–0.9)
LYMPHOCYTES # BLD AUTO: 0.47 K/UL (ref 1–4.8)
LYMPHOCYTES NFR BLD: 3.5 % (ref 22–41)
MCH RBC QN AUTO: 30.8 PG (ref 27–33)
MCHC RBC AUTO-ENTMCNC: 35 G/DL (ref 33.6–35)
MCV RBC AUTO: 87.9 FL (ref 81.4–97.8)
MONOCYTES # BLD AUTO: 0.28 K/UL (ref 0–0.85)
MONOCYTES NFR BLD AUTO: 2.1 % (ref 0–13.4)
NEUTROPHILS # BLD AUTO: 12.44 K/UL (ref 2–7.15)
NEUTROPHILS NFR BLD: 93.6 % (ref 44–72)
NRBC # BLD AUTO: 0 K/UL
NRBC BLD-RTO: 0 /100 WBC
PLATELET # BLD AUTO: 312 K/UL (ref 164–446)
PMV BLD AUTO: 10.2 FL (ref 9–12.9)
POTASSIUM SERPL-SCNC: 3.8 MMOL/L (ref 3.6–5.5)
PROT SERPL-MCNC: 6.6 G/DL (ref 6–8.2)
RBC # BLD AUTO: 4.78 M/UL (ref 4.2–5.4)
SODIUM SERPL-SCNC: 133 MMOL/L (ref 135–145)
WBC # BLD AUTO: 13.3 K/UL (ref 4.8–10.8)

## 2022-07-23 PROCEDURE — 700111 HCHG RX REV CODE 636 W/ 250 OVERRIDE (IP): Performed by: EMERGENCY MEDICINE

## 2022-07-23 PROCEDURE — 96376 TX/PRO/DX INJ SAME DRUG ADON: CPT

## 2022-07-23 PROCEDURE — 85025 COMPLETE CBC W/AUTO DIFF WBC: CPT

## 2022-07-23 PROCEDURE — 99284 EMERGENCY DEPT VISIT MOD MDM: CPT

## 2022-07-23 PROCEDURE — 96374 THER/PROPH/DIAG INJ IV PUSH: CPT

## 2022-07-23 PROCEDURE — 36415 COLL VENOUS BLD VENIPUNCTURE: CPT

## 2022-07-23 PROCEDURE — 96375 TX/PRO/DX INJ NEW DRUG ADDON: CPT

## 2022-07-23 PROCEDURE — 80053 COMPREHEN METABOLIC PANEL: CPT

## 2022-07-23 RX ORDER — DEXAMETHASONE SODIUM PHOSPHATE 10 MG/ML
10 INJECTION, SOLUTION INTRAMUSCULAR; INTRAVENOUS ONCE
Status: COMPLETED | OUTPATIENT
Start: 2022-07-23 | End: 2022-07-23

## 2022-07-23 RX ORDER — DIPHENHYDRAMINE HYDROCHLORIDE 50 MG/ML
25 INJECTION INTRAMUSCULAR; INTRAVENOUS ONCE
Status: COMPLETED | OUTPATIENT
Start: 2022-07-23 | End: 2022-07-23

## 2022-07-23 RX ORDER — DIPHENHYDRAMINE HCL 25 MG
50 CAPSULE ORAL EVERY 6 HOURS PRN
Qty: 30 CAPSULE | Refills: 0 | Status: SHIPPED | OUTPATIENT
Start: 2022-07-23

## 2022-07-23 RX ORDER — PREDNISONE 20 MG/1
20 TABLET ORAL DAILY
Qty: 3 TABLET | Refills: 0 | Status: SHIPPED | OUTPATIENT
Start: 2022-07-23 | End: 2022-07-26

## 2022-07-23 RX ADMIN — DIPHENHYDRAMINE HYDROCHLORIDE 25 MG: 50 INJECTION INTRAMUSCULAR; INTRAVENOUS at 18:12

## 2022-07-23 RX ADMIN — DIPHENHYDRAMINE HYDROCHLORIDE 25 MG: 50 INJECTION INTRAMUSCULAR; INTRAVENOUS at 21:15

## 2022-07-23 RX ADMIN — DEXAMETHASONE SODIUM PHOSPHATE 10 MG: 10 INJECTION INTRAMUSCULAR; INTRAVENOUS at 18:14

## 2022-07-23 ASSESSMENT — FIBROSIS 4 INDEX: FIB4 SCORE: 0.3

## 2022-07-24 NOTE — ED TRIAGE NOTES
Chief Complaint   Patient presents with   • Rash     Generalized rash since this morning. Blotchy, red, itchy.    • Medication Reaction     Possible medication reaction. Started trulicity yesterday.      Pt also +COVID yesterday. Reports she feels like her throat and chest are minimally tight.

## 2022-07-24 NOTE — ED NOTES
Pt in G23, in gown, chart up for ERP.  Pt c/o an itchy/painful rash to body.  Denies airway issues.

## 2022-07-24 NOTE — ED PROVIDER NOTES
ED Provider Note    CHIEF COMPLAINT  Chief Complaint   Patient presents with   • Rash     Generalized rash since this morning. Blotchy, red, itchy.    • Medication Reaction     Possible medication reaction. Started trulicity yesterday.        HPI  Bekah Morrell is a 47 y.o. female who presents with diffuse rash.  Patient has a longstanding history of recurrent rashes.  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  Patient reports that she started Trulicity for a history of diabetes yesterday.  Today she developed a diffuse pruritic rash.  She reports the rash is all over.  She denies any major shortness of breath or feeling like her throat is tightening up.  She denies any difficulty swallowing.  Patient denies any fevers but reports she feels mildly unwell.    REVIEW OF SYSTEMS  ROS    See HPI for further details. All other systems are negative.     PAST MEDICAL HISTORY   has a past medical history of Diabetes (HCC), DRESS syndrome, High cholesterol, Hypertension, and Hypothyroid.    SOCIAL HISTORY  Social History     Tobacco Use   • Smoking status: Never Smoker   • Smokeless tobacco: Never Used   Substance and Sexual Activity   • Alcohol use: Never   • Drug use: Never   • Sexual activity: Not on file       SURGICAL HISTORY   has a past surgical history that includes cholecystectomy (07/2020); hysterectomy radical (2018); and primary c section (1991).    CURRENT MEDICATIONS  Home Medications     Reviewed by Jack Summers R.N. (Registered Nurse) on 07/23/22 at 1707  Med List Status: Partial   Medication Last Dose Status   acetaminophen (TYLENOL) 325 MG Tab  Active   albuterol 108 (90 Base) MCG/ACT Aero Soln inhalation aerosol  Active   B Complex Vitamins (VITAMIN B COMPLEX) Tab  Active   diphenhydrAMINE (BENADRYL) 25 MG capsule  Active   diphenhydrAMINE (BENADRYL) 25 MG capsule  Active   EPINEPHrine 0.3 MG/0.3ML Solution Prefilled Syringe  Active   ibuprofen  "(MOTRIN) 200 MG Tab  Active   levothyroxine (SYNTHROID) 50 MCG Tab  Active   nebivolol (BYSTOLIC) 10 MG Tab  Active                ALLERGIES  Allergies   Allergen Reactions   • Acetazolamide      Tachycardia, dyspnea  Tachycardia, dyspnea  (taking meds for high altitude)     • Allopurinol      \"rash\"       PHYSICAL EXAM  Vitals:    07/23/22 1739   BP: 116/72   Pulse: (!) 116   Resp: 19   Temp:    SpO2: 100%       Physical Exam  Constitutional:       Appearance: She is well-developed.   HENT:      Head: Normocephalic and atraumatic.      Mouth/Throat:      Comments: No oral lesions.  Eyes:      Conjunctiva/sclera: Conjunctivae normal.   Cardiovascular:      Rate and Rhythm: Regular rhythm. Tachycardia present.   Pulmonary:      Effort: Pulmonary effort is normal.      Breath sounds: Normal breath sounds.   Abdominal:      General: Bowel sounds are normal. There is no distension.      Palpations: Abdomen is soft.      Tenderness: There is no abdominal tenderness. There is no rebound.   Musculoskeletal:      Cervical back: Normal range of motion and neck supple.   Skin:     General: Skin is warm and dry.      Findings: Rash present.      Comments: Diffuse urticarial rash with some mild confluence.  Rash affects patient's entire body.  There is no associated oral lesions.  No target lesions, rash is blanchable, no bullae or desquamation   Neurological:      Mental Status: She is alert and oriented to person, place, and time.   Psychiatric:         Behavior: Behavior normal.           DIAGNOSTIC STUDIES / PROCEDURES      LABS  Results for orders placed or performed during the hospital encounter of 07/23/22   CBC WITH DIFFERENTIAL   Result Value Ref Range    WBC 13.3 (H) 4.8 - 10.8 K/uL    RBC 4.78 4.20 - 5.40 M/uL    Hemoglobin 14.7 12.0 - 16.0 g/dL    Hematocrit 42.0 37.0 - 47.0 %    MCV 87.9 81.4 - 97.8 fL    MCH 30.8 27.0 - 33.0 pg    MCHC 35.0 33.6 - 35.0 g/dL    RDW 38.5 35.9 - 50.0 fL    Platelet Count 312 164 - " 446 K/uL    MPV 10.2 9.0 - 12.9 fL    Neutrophils-Polys 93.60 (H) 44.00 - 72.00 %    Lymphocytes 3.50 (L) 22.00 - 41.00 %    Monocytes 2.10 0.00 - 13.40 %    Eosinophils 0.00 0.00 - 6.90 %    Basophils 0.20 0.00 - 1.80 %    Immature Granulocytes 0.60 0.00 - 0.90 %    Nucleated RBC 0.00 /100 WBC    Neutrophils (Absolute) 12.44 (H) 2.00 - 7.15 K/uL    Lymphs (Absolute) 0.47 (L) 1.00 - 4.80 K/uL    Monos (Absolute) 0.28 0.00 - 0.85 K/uL    Eos (Absolute) 0.00 0.00 - 0.51 K/uL    Baso (Absolute) 0.02 0.00 - 0.12 K/uL    Immature Granulocytes (abs) 0.08 0.00 - 0.11 K/uL    NRBC (Absolute) 0.00 K/uL   CMP   Result Value Ref Range    Sodium 133 (L) 135 - 145 mmol/L    Potassium 3.8 3.6 - 5.5 mmol/L    Chloride 98 96 - 112 mmol/L    Co2 19 (L) 20 - 33 mmol/L    Anion Gap 16.0 7.0 - 16.0    Glucose 157 (H) 65 - 99 mg/dL    Bun 11 8 - 22 mg/dL    Creatinine 1.28 0.50 - 1.40 mg/dL    Calcium 8.9 8.5 - 10.5 mg/dL    AST(SGOT) 33 12 - 45 U/L    ALT(SGPT) 21 2 - 50 U/L    Alkaline Phosphatase 47 30 - 99 U/L    Total Bilirubin 0.7 0.1 - 1.5 mg/dL    Albumin 3.8 3.2 - 4.9 g/dL    Total Protein 6.6 6.0 - 8.2 g/dL    Globulin 2.8 1.9 - 3.5 g/dL    A-G Ratio 1.4 g/dL   ESTIMATED GFR   Result Value Ref Range    GFR (CKD-EPI) 51 (A) >60 mL/min/1.73 m 2         RADIOLOGY  No orders to display           COURSE & MEDICAL DECISION MAKING  Pertinent Labs & Imaging studies reviewed. (See chart for details)  Patient here with diffuse urticarial rash 24 hours outside of taking trilogy.  She also took some aspirin today as she is having a mild headache.  The mild headache is since resolved.  Dress syndrome is possible, patient is mildly tachycardic but she is not febrile.  Will check basic labs.  Giving Benadryl and dexamethasone.  Patient rash does not appear consistent with erythema multiforme or developing Quiroga-Ezio's.  Will observe patient here in the emergency department.  Following IV Benadryl patient's symptoms improved  significantly.  Patient with mild leukocytosis on her labs but no eosinophilia.  Patient remains without any fever.  Patient's rash has returned.  Will give repeat Benadryl.  Patient remains without any oral lesions.  I discussed with patient that if she develops blistering rash, rash around her mouth, anus or vagina, she develops a fever or has any other concerns to return to the emergency department.  I refilled patient's EpiPen and discussed appropriate use.  I have given her a short course of prednisone.  I have asked her to abstain from any Trulicity or ibuprofen or NSAIDs until cleared by her primary care physician.      The patient will return for worsening symptoms and is stable at the time of discharge. The patient verbalizes understanding and will comply.    FINAL IMPRESSION    1. Allergic reaction, initial encounter               Electronically signed by: Ethan Oscar M.D., 7/23/2022 6:00 PM

## 2022-07-24 NOTE — DISCHARGE INSTRUCTIONS
Stop the Trulicity, I believe this is likely what your rash is from.  I would also like you to stop taking NSAIDs given your history of DRESS  syndrome in the past.  Take 2 tablets of Benadryl every 4-6 hours as needed to help control the rash.  It may take a while for the rash to resolve.  If you develop significant shortness of breath or have any other concerns return to the emergency department.  If you develop blistering rash return to the emergency department or if you develop any blisters or ulcers in your mouth.  If your symptoms are severe and you have severe shortness of breath or throat tightening use the EpiPen I have prescribed.

## 2022-11-29 ENCOUNTER — OFFICE VISIT (OUTPATIENT)
Dept: URGENT CARE | Facility: CLINIC | Age: 47
End: 2022-11-29
Payer: COMMERCIAL

## 2022-11-29 ENCOUNTER — APPOINTMENT (OUTPATIENT)
Dept: URGENT CARE | Facility: PHYSICIAN GROUP | Age: 47
End: 2022-11-29
Payer: COMMERCIAL

## 2022-11-29 VITALS
RESPIRATION RATE: 17 BRPM | DIASTOLIC BLOOD PRESSURE: 72 MMHG | BODY MASS INDEX: 26.5 KG/M2 | HEIGHT: 60 IN | HEART RATE: 89 BPM | SYSTOLIC BLOOD PRESSURE: 116 MMHG | TEMPERATURE: 98.7 F | OXYGEN SATURATION: 98 % | WEIGHT: 135 LBS

## 2022-11-29 DIAGNOSIS — M10.9 ACUTE GOUT OF FOOT, UNSPECIFIED CAUSE, UNSPECIFIED LATERALITY: ICD-10-CM

## 2022-11-29 DIAGNOSIS — J02.9 PHARYNGITIS, UNSPECIFIED ETIOLOGY: ICD-10-CM

## 2022-11-29 LAB
INT CON NEG: NORMAL
INT CON POS: NORMAL
S PYO AG THROAT QL: POSITIVE

## 2022-11-29 PROCEDURE — 99215 OFFICE O/P EST HI 40 MIN: CPT | Performed by: NURSE PRACTITIONER

## 2022-11-29 PROCEDURE — 87880 STREP A ASSAY W/OPTIC: CPT | Performed by: NURSE PRACTITIONER

## 2022-11-29 RX ORDER — COLCHICINE 0.6 MG/1
TABLET ORAL
Qty: 3 TABLET | Refills: 0 | Status: SHIPPED | OUTPATIENT
Start: 2022-11-29

## 2022-11-29 RX ORDER — AMOXICILLIN 500 MG/1
500 CAPSULE ORAL 2 TIMES DAILY
Qty: 20 CAPSULE | Refills: 0 | Status: SHIPPED | OUTPATIENT
Start: 2022-11-29 | End: 2022-12-09

## 2022-11-29 ASSESSMENT — ENCOUNTER SYMPTOMS
FEVER: 0
DIZZINESS: 0
NAUSEA: 0
EYE REDNESS: 0
SHORTNESS OF BREATH: 0
VOMITING: 0
MYALGIAS: 0
SORE THROAT: 1
CHILLS: 0

## 2022-11-29 ASSESSMENT — FIBROSIS 4 INDEX: FIB4 SCORE: 1

## 2022-11-30 NOTE — PROGRESS NOTES
Patient is aSubjective:   Bekah Morrell is a 47 y.o. female who presents for Gout (Knee and feet ) and Pharyngitis      HPI  47-year-old female presents urgent care for evaluation of what she suspects an acute gout flare versus having pain in her bilateral feet and knees has a history of gout.  I lastDenies trauma or injury.+Patient states in the past she is taking colchicine for gout she is unable to take anti-inflammatories.  Patient also complains of a sore throat has mild chills, denies cough.  Denies sick contacts.  Patient does have history of dress syndrome which she is monitored by a specialist when she is ill for labs patient is requesting to have lab slip as she is traveling from out of the area.  Review of Systems   Constitutional:  Negative for chills and fever.   HENT:  Positive for sore throat.    Eyes:  Negative for redness.   Respiratory:  Negative for shortness of breath.    Cardiovascular:  Negative for chest pain.   Gastrointestinal:  Negative for nausea and vomiting.   Genitourinary:  Negative for dysuria.   Musculoskeletal:  Positive for joint pain. Negative for myalgias.   Skin:  Negative for rash.   Neurological:  Negative for dizziness.     PMH:  has a past medical history of Diabetes (HCC), DRESS syndrome, High cholesterol, Hypertension, and Hypothyroid.  MEDS:   Current Outpatient Medications:     colchicine (COLCRYS) 0.6 MG Tab, 1.2mg POx1, then 0.6 mg PO 1 hour later x1 dose. Max 1.8mg total dose/course, Disp: 3 Tablet, Rfl: 0    amoxicillin (AMOXIL) 500 MG Cap, Take 1 Capsule by mouth 2 times a day for 10 days., Disp: 20 Capsule, Rfl: 0    EPINEPHrine 0.3 MG/0.3ML Solution Prefilled Syringe, Inject the contents of the epipen into the thigh, hold for 3 seconds and release from thigh., Disp: 1 Each, Rfl: 0    diphenhydrAMINE (BENADRYL) 25 MG capsule, Take 2 Capsules by mouth every 6 hours as needed for Itching or Rash., Disp: 30 Capsule, Rfl: 0    albuterol 108 (90 Base)  "MCG/ACT Aero Soln inhalation aerosol, Inhale 2 Puffs every four hours as needed., Disp: 8 g, Rfl: 1    levothyroxine (SYNTHROID) 50 MCG Tab, Take 50 mcg by mouth every Monday, Wednesday, and Friday., Disp: , Rfl:     ibuprofen (MOTRIN) 200 MG Tab, Take 200 mg by mouth every 6 hours as needed for Headache., Disp: , Rfl:     acetaminophen (TYLENOL) 325 MG Tab, Take 650 mg by mouth every four hours as needed for Mild Pain., Disp: , Rfl:     B Complex Vitamins (VITAMIN B COMPLEX) Tab, Take 1 tablet by mouth every day., Disp: , Rfl:     nebivolol (BYSTOLIC) 10 MG Tab, Take 10 mg by mouth every day., Disp: , Rfl:   ALLERGIES:   Allergies   Allergen Reactions    Acetazolamide      Tachycardia, dyspnea  Tachycardia, dyspnea  (taking meds for high altitude)      Allopurinol      \"rash\"     SURGHX:   Past Surgical History:   Procedure Laterality Date    CHOLECYSTECTOMY  07/2020    HYSTERECTOMY RADICAL  2018    PRIMARY C SECTION  1991     SOCHX:  reports that she has never smoked. She has never used smokeless tobacco. She reports that she does not drink alcohol and does not use drugs.  FH: Family history was reviewed, no pertinent findings to report       Objective:     /72 (BP Location: Right arm, Patient Position: Sitting, BP Cuff Size: Adult long)   Pulse 89   Temp 37.1 °C (98.7 °F) (Temporal)   Resp 17   Ht 1.524 m (5')   Wt 61.2 kg (135 lb)   SpO2 98%   BMI 26.37 kg/m²     Physical Exam  Vitals and nursing note reviewed.   Constitutional:       General: She is not in acute distress.     Appearance: She is well-developed.   HENT:      Head: Normocephalic and atraumatic.      Right Ear: External ear normal.      Left Ear: External ear normal.      Nose: Nose normal.      Mouth/Throat:      Mouth: Mucous membranes are moist.   Eyes:      Conjunctiva/sclera: Conjunctivae normal.   Cardiovascular:      Rate and Rhythm: Normal rate.   Pulmonary:      Effort: Pulmonary effort is normal. No respiratory distress.      " Breath sounds: Normal breath sounds.   Abdominal:      General: There is no distension.   Musculoskeletal:      Right knee: Swelling present. No bony tenderness. Normal range of motion. Tenderness present.      Left knee: Swelling present. No bony tenderness. Normal range of motion. Tenderness present.      Right foot: Decreased range of motion. Normal capillary refill. Swelling, tenderness and bony tenderness present. No deformity or crepitus. Normal pulse.      Left foot: Decreased range of motion. Normal capillary refill. Swelling, tenderness and bony tenderness present. No deformity or crepitus. Normal pulse.   Skin:     General: Skin is warm and dry.      Findings: Rash present.          Neurological:      General: No focal deficit present.      Mental Status: She is alert and oriented to person, place, and time. Mental status is at baseline.      Gait: Gait (gait at baseline) normal.   Psychiatric:         Judgment: Judgment normal.       Assessment/Plan:     Diagnosis and associated orders:     1. Pharyngitis, unspecified etiology  POCT Rapid Strep A    amoxicillin (AMOXIL) 500 MG Cap      2. Acute gout of foot, unspecified cause, unspecified laterality  colchicine (COLCRYS) 0.6 MG Tab    CBC WITH DIFFERENTIAL    Sed Rate    URIC ACID    Comp Metabolic Panel           Comments/MDM:     POSITIVE Strep A.  I personally reviewed prior external notes and prior test results pertinent to today's visit.   Discussed management options, risks and benefits, and alternatives to treatment plan agreed upon.   Red flags discussed and indications to immediately call 911 or present to the Emergency Department.   Supportive care, differential diagnoses, and indications for immediate follow-up discussed with patient.    Patient expresses understanding and agrees to plan. Patient denies any other questions or concerns.          My total time spent caring for the patient on the day of the encounter was 45 minutes.   This does  not include time spent on separately billable procedures/tests.        Please note that this dictation was created using voice recognition software. I have made a reasonable attempt to correct obvious errors, but I expect that there are errors of grammar and possibly content that I did not discover before finalizing the note.    This note was electronically signed by Jb BAUMAN.

## 2022-12-02 ENCOUNTER — HOSPITAL ENCOUNTER (OUTPATIENT)
Dept: LAB | Facility: MEDICAL CENTER | Age: 47
End: 2022-12-02
Attending: NURSE PRACTITIONER
Payer: COMMERCIAL

## 2022-12-02 DIAGNOSIS — M10.9 ACUTE GOUT OF FOOT, UNSPECIFIED CAUSE, UNSPECIFIED LATERALITY: ICD-10-CM

## 2022-12-02 LAB
ALBUMIN SERPL BCP-MCNC: 3.3 G/DL (ref 3.2–4.9)
ALBUMIN/GLOB SERPL: 0.7 G/DL
ALP SERPL-CCNC: 91 U/L (ref 30–99)
ALT SERPL-CCNC: 18 U/L (ref 2–50)
ANION GAP SERPL CALC-SCNC: 12 MMOL/L (ref 7–16)
AST SERPL-CCNC: 25 U/L (ref 12–45)
BASOPHILS # BLD AUTO: 0 % (ref 0–1.8)
BASOPHILS # BLD: 0 K/UL (ref 0–0.12)
BILIRUB SERPL-MCNC: 0.5 MG/DL (ref 0.1–1.5)
BUN SERPL-MCNC: 54 MG/DL (ref 8–22)
CALCIUM SERPL-MCNC: 9.5 MG/DL (ref 8.5–10.5)
CHLORIDE SERPL-SCNC: 98 MMOL/L (ref 96–112)
CO2 SERPL-SCNC: 22 MMOL/L (ref 20–33)
CREAT SERPL-MCNC: 2.06 MG/DL (ref 0.5–1.4)
EOSINOPHIL # BLD AUTO: 0.4 K/UL (ref 0–0.51)
EOSINOPHIL NFR BLD: 2.6 % (ref 0–6.9)
ERYTHROCYTE [DISTWIDTH] IN BLOOD BY AUTOMATED COUNT: 44.2 FL (ref 35.9–50)
GFR SERPLBLD CREATININE-BSD FMLA CKD-EPI: 29 ML/MIN/1.73 M 2
GLOBULIN SER CALC-MCNC: 4.5 G/DL (ref 1.9–3.5)
GLUCOSE SERPL-MCNC: 105 MG/DL (ref 65–99)
HCT VFR BLD AUTO: 43.6 % (ref 37–47)
HGB BLD-MCNC: 14.2 G/DL (ref 12–16)
LYMPHOCYTES # BLD AUTO: 1.97 K/UL (ref 1–4.8)
LYMPHOCYTES NFR BLD: 12.8 % (ref 22–41)
MANUAL DIFF BLD: NORMAL
MCH RBC QN AUTO: 30.3 PG (ref 27–33)
MCHC RBC AUTO-ENTMCNC: 32.6 G/DL (ref 33.6–35)
MCV RBC AUTO: 93.2 FL (ref 81.4–97.8)
MONOCYTES # BLD AUTO: 0.4 K/UL (ref 0–0.85)
MONOCYTES NFR BLD AUTO: 2.6 % (ref 0–13.4)
MORPHOLOGY BLD-IMP: NORMAL
MYELOCYTES NFR BLD MANUAL: 2.6 %
NEUTROPHILS # BLD AUTO: 12.1 K/UL (ref 2–7.15)
NEUTROPHILS NFR BLD: 76.9 % (ref 44–72)
NEUTS BAND NFR BLD MANUAL: 1.7 % (ref 0–10)
NRBC # BLD AUTO: 0 K/UL
NRBC BLD-RTO: 0 /100 WBC
PLATELET # BLD AUTO: 394 K/UL (ref 164–446)
PLATELET BLD QL SMEAR: NORMAL
PMV BLD AUTO: 10.7 FL (ref 9–12.9)
POTASSIUM SERPL-SCNC: 4.3 MMOL/L (ref 3.6–5.5)
PROMYELOCYTES NFR BLD MANUAL: 0.8 %
PROT SERPL-MCNC: 7.8 G/DL (ref 6–8.2)
RBC # BLD AUTO: 4.68 M/UL (ref 4.2–5.4)
RBC BLD AUTO: PRESENT
SODIUM SERPL-SCNC: 132 MMOL/L (ref 135–145)
TOXIC GRANULES BLD QL SMEAR: SLIGHT
URATE SERPL-MCNC: 10.1 MG/DL (ref 1.9–8.2)
WBC # BLD AUTO: 15.4 K/UL (ref 4.8–10.8)

## 2022-12-02 PROCEDURE — 85007 BL SMEAR W/DIFF WBC COUNT: CPT

## 2022-12-02 PROCEDURE — 36415 COLL VENOUS BLD VENIPUNCTURE: CPT

## 2022-12-02 PROCEDURE — 85652 RBC SED RATE AUTOMATED: CPT

## 2022-12-02 PROCEDURE — 84550 ASSAY OF BLOOD/URIC ACID: CPT

## 2022-12-02 PROCEDURE — 85025 COMPLETE CBC W/AUTO DIFF WBC: CPT

## 2022-12-02 PROCEDURE — 80053 COMPREHEN METABOLIC PANEL: CPT

## 2022-12-03 LAB — ERYTHROCYTE [SEDIMENTATION RATE] IN BLOOD BY WESTERGREN METHOD: 15 MM/HOUR (ref 0–25)

## 2022-12-07 ENCOUNTER — TELEPHONE (OUTPATIENT)
Dept: URGENT CARE | Facility: CLINIC | Age: 47
End: 2022-12-07
Payer: COMMERCIAL